# Patient Record
Sex: MALE | Race: WHITE | NOT HISPANIC OR LATINO | Employment: OTHER | URBAN - METROPOLITAN AREA
[De-identification: names, ages, dates, MRNs, and addresses within clinical notes are randomized per-mention and may not be internally consistent; named-entity substitution may affect disease eponyms.]

---

## 2013-01-04 LAB — EJECTION FRACTION: 65

## 2017-01-13 ENCOUNTER — TRANSFERRED RECORDS (OUTPATIENT)
Dept: HEALTH INFORMATION MANAGEMENT | Facility: CLINIC | Age: 67
End: 2017-01-13

## 2017-01-13 LAB — EJECTION FRACTION: 60

## 2017-03-26 DIAGNOSIS — F39 MOOD DISORDER (H): ICD-10-CM

## 2017-03-27 NOTE — TELEPHONE ENCOUNTER
risperiDONE (RISPERDAL) 1 MG tablet     Last Written Prescription Date: 09/21/16  Last Fill Quantity: 180, # refills: 1  [place] [facility] [facility] prescribing provider: 09/21/16       BP Readings from Last 3 Encounters:   09/21/16 124/80   03/14/16 117/80   10/27/15 98/66     Pulse Readings from Last 2 Encounters:   09/21/16 82   03/14/16 80     Lab Results   Component Value Date    GLC 94 09/21/2016     No results found for: WBC  No results found for: RBC  No results found for: HGB  No results found for: HCT  No components found for: MCT  No results found for: MCV  No results found for: MCH  No results found for: MCHC  No results found for: RDW  No results found for: PLT  Lab Results   Component Value Date    CHOL 149 02/24/2015     Lab Results   Component Value Date    HDL 48 02/24/2015     Lab Results   Component Value Date     03/14/2016    LDL 90 02/24/2015     Lab Results   Component Value Date    TRIG 55 02/24/2015     No results found for: GARFIELD [facility]

## 2017-03-28 NOTE — TELEPHONE ENCOUNTER
Routing refill request to provider for review/approval because:  Labs not current:  CBC    Danita Peguero RN

## 2017-03-29 RX ORDER — RISPERIDONE 1 MG/1
TABLET ORAL
Qty: 180 TABLET | Refills: 0 | Status: SHIPPED | OUTPATIENT
Start: 2017-03-29 | End: 2017-06-21

## 2017-03-29 NOTE — TELEPHONE ENCOUNTER
risperiDONE (RISPERDAL) 1 MG tablet     Last Written Prescription Date: 9/21/16  Last Fill Quantity: 180 tablets, # refills: 1  Last Office Visit with G, P or [facility] prescribing provider: 9/21/16        BP Readings from Last 3 Encounters:   09/21/16 124/80   03/14/16 117/80   10/27/15 98/66     Pulse Readings from Last 2 Encounters:   09/21/16 82   03/14/16 80     Lab Results   Component Value Date    GLC 94 09/21/2016     No results found for: WBC  No results found for: RBC  No results found for: HGB  No results found for: HCT  No components found for: MCT  No results found for: MCV  No results found for: MCH  No results found for: MCHC  No results found for: RDW  No results found for: PLT  Lab Results   Component Value Date    CHOL 149 02/24/2015     Lab Results   Component Value Date    HDL 48 02/24/2015     Lab Results   Component Value Date     03/14/2016    LDL 90 02/24/2015     Lab Results   Component Value Date    TRIG 55 02/24/2015     No results found for: GARFIELD [facility]

## 2017-06-21 ENCOUNTER — OFFICE VISIT (OUTPATIENT)
Dept: FAMILY MEDICINE | Facility: CLINIC | Age: 67
End: 2017-06-21
Payer: OTHER GOVERNMENT

## 2017-06-21 VITALS
HEART RATE: 75 BPM | HEIGHT: 75 IN | SYSTOLIC BLOOD PRESSURE: 95 MMHG | WEIGHT: 199 LBS | OXYGEN SATURATION: 95 % | BODY MASS INDEX: 24.74 KG/M2 | TEMPERATURE: 96.5 F | DIASTOLIC BLOOD PRESSURE: 66 MMHG

## 2017-06-21 DIAGNOSIS — F39 MOOD DISORDER (H): Primary | ICD-10-CM

## 2017-06-21 DIAGNOSIS — I42.2 HYPERTROPHIC CARDIOMYOPATHY (H): ICD-10-CM

## 2017-06-21 DIAGNOSIS — I34.0 NON-RHEUMATIC MITRAL REGURGITATION: ICD-10-CM

## 2017-06-21 LAB
ALBUMIN SERPL-MCNC: 3.7 G/DL (ref 3.4–5)
ALP SERPL-CCNC: 96 U/L (ref 40–150)
ALT SERPL W P-5'-P-CCNC: 22 U/L (ref 0–70)
ANION GAP SERPL CALCULATED.3IONS-SCNC: 9 MMOL/L (ref 3–14)
AST SERPL W P-5'-P-CCNC: 18 U/L (ref 0–45)
BILIRUB SERPL-MCNC: 0.3 MG/DL (ref 0.2–1.3)
BUN SERPL-MCNC: 11 MG/DL (ref 7–30)
CALCIUM SERPL-MCNC: 9 MG/DL (ref 8.5–10.1)
CHLORIDE SERPL-SCNC: 106 MMOL/L (ref 94–109)
CHOLEST SERPL-MCNC: 178 MG/DL
CO2 SERPL-SCNC: 25 MMOL/L (ref 20–32)
CREAT SERPL-MCNC: 1.14 MG/DL (ref 0.66–1.25)
GFR SERPL CREATININE-BSD FRML MDRD: 65 ML/MIN/1.7M2
GLUCOSE SERPL-MCNC: 120 MG/DL (ref 70–99)
HDLC SERPL-MCNC: 56 MG/DL
LDLC SERPL CALC-MCNC: 97 MG/DL
NONHDLC SERPL-MCNC: 122 MG/DL
POTASSIUM SERPL-SCNC: 4.3 MMOL/L (ref 3.4–5.3)
PROT SERPL-MCNC: 6.8 G/DL (ref 6.8–8.8)
SODIUM SERPL-SCNC: 140 MMOL/L (ref 133–144)
TRIGL SERPL-MCNC: 124 MG/DL

## 2017-06-21 PROCEDURE — 36415 COLL VENOUS BLD VENIPUNCTURE: CPT | Performed by: FAMILY MEDICINE

## 2017-06-21 PROCEDURE — 80061 LIPID PANEL: CPT | Performed by: FAMILY MEDICINE

## 2017-06-21 PROCEDURE — 99213 OFFICE O/P EST LOW 20 MIN: CPT | Performed by: FAMILY MEDICINE

## 2017-06-21 PROCEDURE — 80053 COMPREHEN METABOLIC PANEL: CPT | Performed by: FAMILY MEDICINE

## 2017-06-21 RX ORDER — RISPERIDONE 1 MG/1
TABLET ORAL
Qty: 180 TABLET | Refills: 1 | Status: SHIPPED | OUTPATIENT
Start: 2017-06-21 | End: 2017-12-13

## 2017-06-21 NOTE — NURSING NOTE
"Chief Complaint   Patient presents with     MOOD CHANGES       Initial BP 95/66 (BP Location: Right arm, Patient Position: Chair, Cuff Size: Adult Large)  Pulse 75  Temp 96.5  F (35.8  C) (Oral)  Ht 6' 3\" (1.905 m)  Wt 199 lb (90.3 kg)  SpO2 95%  BMI 24.87 kg/m2 Estimated body mass index is 24.87 kg/(m^2) as calculated from the following:    Height as of this encounter: 6' 3\" (1.905 m).    Weight as of this encounter: 199 lb (90.3 kg).  Medication Reconciliation: complete   An,CMA (AMAA)      "

## 2017-06-21 NOTE — PROGRESS NOTES
"  SUBJECTIVE:                                                    Javier Lovett is a 66 year old male who presents to clinic today for the following health issues:      Follow up on Mood Changes: Doing well with medication.  No side effects.  Still retired and doing things around the house.    Cardiomyopathy - still seeing cardiology every 6 months and doing well.    Problem list and histories reviewed & adjusted, as indicated.  Additional history: as documented    Patient Active Problem List   Diagnosis     CARDIOVASCULAR SCREENING; LDL GOAL LESS THAN 100     Hypertrophic cardiomyopathy (H)     MR (mitral regurgitation)     Mood disorder (H)     Advance care planning     History reviewed. No pertinent surgical history.    Social History   Substance Use Topics     Smoking status: Never Smoker     Smokeless tobacco: Never Used     Alcohol use 0.0 oz/week     0 Standard drinks or equivalent per week      Comment: social     History reviewed. No pertinent family history.        Reviewed and updated as needed this visit by clinical staff       Reviewed and updated as needed this visit by Provider         ROS:  Constitutional, HEENT, cardiovascular, pulmonary, gi and gu systems are negative, except as otherwise noted.    OBJECTIVE:                                                    BP 95/66 (BP Location: Right arm, Patient Position: Chair, Cuff Size: Adult Large)  Pulse 75  Temp 96.5  F (35.8  C) (Oral)  Ht 6' 3\" (1.905 m)  Wt 199 lb (90.3 kg)  SpO2 95%  BMI 24.87 kg/m2  Body mass index is 24.87 kg/(m^2).  GENERAL: healthy, alert and no distress  NECK: no adenopathy, no asymmetry, masses, or scars and thyroid normal to palpation  RESP: lungs clear to auscultation - no rales, rhonchi or wheezes  CV: regular rates and rhythm, normal S1 S2, no S3 or S4, grade 3/6 systolic murmur, peripheral pulses strong and no peripheral edema  ABDOMEN: soft, nontender, no hepatosplenomegaly, no masses and bowel sounds normal  MS: no " gross musculoskeletal defects noted, no edema  PSYCH: mentation appears normal and affect flat    Diagnostic Test Results:  none      ASSESSMENT/PLAN:                                                    1. Mood disorder (H)  Stable - continue current treatment and check labs.  - Lipid panel reflex to direct LDL  - risperiDONE (RISPERDAL) 1 MG tablet; TAKE 2 TABLETS BY MOUTH EVERY DAY  Dispense: 180 tablet; Refill: 1  - Comprehensive metabolic panel    2. Hypertrophic cardiomyopathy (H)  Continue as per cardiology    3. Non-rheumatic mitral regurgitation  Continue as per cardiology    The uses and side effects, including black box warnings as appropriate, were discussed in detail.  All patient questions were answered.  The patient was instructed to call immediately if any side effects developed.     FUTURE APPOINTMENTS:       - Follow-up visit in 6 - 12 months.    Rafaela Herr MD  [facility]

## 2017-06-21 NOTE — PATIENT INSTRUCTIONS
Based on your medical history and these are the current health maintenance or preventive care services that you are due for (some may have been done at this visit)  Health Maintenance Due   Topic Date Due     LIPID MONITORING Q1 YEAR 03/14/2017         At [facility], we strive to deliver an exceptional experience to you, every time we see you.    If you receive a survey in the mail, please send us back your thoughts. We really do value your feedback.    Your care team's suggested websites for health information:  Www.[employer].org : Up to date and easily searchable information on multiple topics.  Www.medlineplus.gov : medication info, interactive tutorials, watch real surgeries online  Www.familydoctor.org : good info from the Academy of Family Physicians  Www.cdc.gov : public health info, travel advisories, epidemics (H1N1)  Www.aap.org : children's health info, normal development, vaccinations  Www.health.[facility].[place].us : [place] dept of health, public health issues in [place], N1N1    How to contact your care team:   Team [facility] [phone]         Pharmacy [phone]    Dr. Fernandez, Courtney Flores PA-C, Dr. Blood, Ilana CLIFFORD CNP, Kimberley Kwan PA-C, Dr. Lake, and VENESSA Frederick CNP    Team RNs: Gayle Patterson      Clinic hours  M-Th 7 am-7 pm   Fri 7 am-5 pm.   Urgent care M-F 11 am-9 pm,   Sat/Sun 9 am-5 pm.  Pharmacy M-Th 8 am-8 pm Fri 8 am-6 pm  Sat/Sun 9 am-5 pm.     All password changes, disabled accounts, or ID changes in [employer] will be done by our Access Services Department.    If you need help with your account or password, call: [phone]. Clinic staff no longer has the ability to change passwords.

## 2017-06-21 NOTE — MR AVS SNAPSHOT
After Visit Summary   6/21/2017    Javier Lovett    MRN: [id number]           Patient Information     Date Of Birth          1950        Visit Information        Provider Department      6/21/2017 1:40 PM Rafaela Marcano MD [facility]        Today's Diagnoses     Mood disorder (H)    -  1    Hypertrophic cardiomyopathy (H)        Non-rheumatic mitral regurgitation          Care Instructions    Based on your medical history and these are the current health maintenance or preventive care services that you are due for (some may have been done at this visit)  Health Maintenance Due   Topic Date Due     LIPID MONITORING Q1 YEAR  03/14/2017         At [facility], we strive to deliver an exceptional experience to you, every time we see you.    If you receive a survey in the mail, please send us back your thoughts. We really do value your feedback.    Your care team's suggested websites for health information:  Www.[employer] : Up to date and easily searchable information on multiple topics.  Www.medlineplus.gov : medication info, interactive tutorials, watch real surgeries online  Www.familydoctor.org : good info from the Academy of Family Physicians  Www.cdc.gov : public health info, travel advisories, epidemics (H1N1)  Www.aap.org : children's health info, normal development, vaccinations  Www.health.state.[place].us : [place] dept of health, public health issues in [place], N1N1    How to contact your care team:   Team [facility] [phone]         Pharmacy [phone]    Dr. Fernandez, Courtney Flores PA-C, Ilana Campbell CNP, Kimberley Kwan PA-C, Dr. Lake, and VENESSA Frederick CNP    Team RNs: Gayle & Yesenia      Clinic hours  M-Th 7 am-7 pm   Fri 7 am-5 pm.   Urgent care M-F 11 am-9 pm,   Sat/Sun 9 am-5 pm.  Pharmacy M-Th 8 am-8 pm Fri 8 am-6 pm  Sat/Sun 9 am-5 pm.     All password changes, disabled accounts, or ID  "changes in [employer] will be done by our Access Services Department.    If you need help with your account or password, call: [phone]. Clinic staff no longer has the ability to change passwords.             Follow-ups after your visit        Your next 10 appointments already scheduled     Jun 21, 2017  1:40 PM CDT   [facility] Rafaela [facility]   [facility]    [address] [facility] [address] [phone]           Bring a current list of meds and any records pertaining to this visit.  For Physicals, please bring immunization records and any forms needing to be filled out.  Please arrive 10 minutes early to complete paperwork.            Aug 10, 2017  1:00 PM CDT   New Visit with Luis Navarro MD   [facility]    [id number] [place] [address] [phone]              Who to contact     If you have questions or need follow up information about today's clinic visit or your schedule please contact [facility] directly at [phone].  Normal or non-critical lab and imaging results will be communicated to you by [email or web address], letter or phone within 4 business days after the clinic has received the results. If you do not hear from us within 7 days, please contact the clinic through [email or web address] or phone. If you have a critical or abnormal lab result, we will notify you by phone as soon as possible.  Submit refill requests through [employer] or call your pharmacy and they will forward the refill request to us. Please allow 3 business days for your refill to be completed.          Additional Information About Your Visit        [employer] Information     [employer] lets you send messages to your doctor, view your test results, renew your prescriptions, schedule appointments and more. To sign up, go to [email or web address] . Click on \"Log " "in\" on the left side of the screen, which will take you to the Welcome page. Then click on \"Sign up Now\" on the right side of the page.     You will be asked to enter the access code listed below, as well as some personal information. Please follow the directions to create your username and password.     Your access code is: [email or web address]  Expires: 2017  1:39 PM     Your access code will  in 90 days. If you need help or a new code, please call your [facility] or [phone].        Care EveryWhere ID     This is your Care EveryWhere ID. This could be used by other organizations to access your [facility] medical records  [id number]        Your Vitals Were     Pulse Temperature Height Pulse Oximetry BMI (Body Mass Index)       75 96.5  F (35.8  C) (Oral) 6' 3\" (1.905 m) 95% 24.87 kg/m2        Blood Pressure from Last 3 Encounters:   17 95/66   16 124/80   16 117/80    Weight from Last 3 Encounters:   17 199 lb (90.3 kg)   16 203 lb (92.1 kg)   16 204 lb 6.4 oz (92.7 kg)              We Performed the Following     Comprehensive metabolic panel     Lipid panel reflex to direct LDL          Today's Medication Changes          These changes are accurate as of: 17  1:39 PM.  If you have any questions, ask your nurse or doctor.               These medicines have changed or have updated prescriptions.        Dose/Directions    risperiDONE 1 MG tablet   Commonly known as:  risperDAL   This may have changed:  See the new instructions.   Used for:  Mood disorder (H)   Changed by:  Rafaela Marcano MD        TAKE 2 TABLETS BY MOUTH EVERY DAY   Quantity:  180 tablet   Refills:  1            Where to get your medicines      These medications were sent to [email or web address] - [address] AT [facility] & [address], [place] [address]     Phone:  [phone]     risperiDONE 1 MG tablet                " Primary Care Provider Office Phone # Fax #    Rafaela Ashley Herr [phone]       [facility] [address]  [facility] [address]        Equal Access to Services     JESUSALLY JENIFFER : Hadii toño ku hadcameliao Soomaali, waaxda luqadaha, qaybta kaalmada adeegyada, waxay idiin hayaan gwen khpeteraj lynne. So [facility] [phone].    ATENCIÓN: Si habla español, tiene a spangler disposición servicios gratuitos de asistencia lingüística. Llame al [phone].    We comply with applicable federal civil rights laws and [place] laws. We do not discriminate on the basis of race, color, national origin, age, disability sex, sexual orientation or gender identity.            Thank you!     Thank you for choosing [facility]  for your care. Our goal is always to provide you with excellent care. Hearing back from our patients is one way we can continue to improve our services. Please take a few minutes to complete the written survey that you may receive in the mail after your visit with us. Thank you!             Your Updated Medication List - Protect others around you: Learn how to safely use, store and throw away your medicines at www.disposemymeds.org.          This list is accurate as of: 6/21/17  1:39 PM.  Always use your most recent med list.                   Brand Name Dispense Instructions for use Diagnosis    aspirin 81 MG tablet      Take  by mouth daily.        CLINDAMYCIN HCL PO           GARLIC PO      Take  by mouth daily.        metoprolol 12.5 mg Tabs half-tab    LOPRESSOR     Take 12.5 mg by mouth 2 times daily.        MULTI VITAMIN/MINERALS PO      Take  by mouth daily.        risperiDONE 1 MG tablet    risperDAL    180 tablet    TAKE 2 TABLETS BY MOUTH EVERY DAY    Mood disorder (H)       VITAMIN D PO      Take  by mouth daily.

## 2017-06-21 NOTE — LETTER
[address] [address]  [phone]    June 26, 2017    Javier Lovett  [address] [facility] [address]    Mr. Lovett,     All of your labs were normal for you.     Please contact the clinic if you have additional questions.  Thank you.     Sincerely,     Rafaela Herr/kassie    Results for orders placed or performed in visit on 06/21/17   Lipid panel reflex to direct LDL   Result Value Ref Range    Cholesterol 178 <200 mg/dL    Triglycerides 124 <150 mg/dL    HDL Cholesterol 56 >39 mg/dL    LDL Cholesterol Calculated 97 <100 mg/dL    Non HDL Cholesterol 122 <130 mg/dL   Comprehensive metabolic panel   Result Value Ref Range    Sodium 140 133 - 144 mmol/L    Potassium 4.3 3.4 - 5.3 mmol/L    Chloride 106 94 - 109 mmol/L    Carbon Dioxide 25 20 - 32 mmol/L    Anion Gap 9 3 - 14 mmol/L    Glucose 120 (H) 70 - 99 mg/dL    Urea Nitrogen 11 7 - 30 mg/dL    Creatinine 1.14 0.66 - 1.25 mg/dL    GFR Estimate 65 >60 mL/min/1.7m2    GFR Estimate If Black 78 >60 mL/min/1.7m2    Calcium 9.0 8.5 - 10.1 mg/dL    Bilirubin Total 0.3 0.2 - 1.3 mg/dL    Albumin 3.7 3.4 - 5.0 g/dL    Protein Total 6.8 6.8 - 8.8 g/dL    Alkaline Phosphatase 96 40 - 150 U/L    ALT 22 0 - 70 U/L    AST 18 0 - 45 U/L

## 2017-06-26 NOTE — PROGRESS NOTES
MrWilber Lovett,    All of your labs were normal for you.    Please contact the clinic if you have additional questions.  Thank you.    Sincerely,    Rafaela Herr

## 2017-12-13 DIAGNOSIS — F39 MOOD DISORDER (H): ICD-10-CM

## 2017-12-13 NOTE — TELEPHONE ENCOUNTER
Requested Prescriptions   Pending Prescriptions Disp Refills     risperiDONE (RISPERDAL) 1 MG tablet [Pharmacy Med Name: RISPERIDONE 1MG TABLETS]  Last Written Prescription Date:  06/21/17  Last Fill Quantity: 180,  # refills: 1   [place] [facility] or [facility] prescribing provider:  06/21/17   Future Office Visit:    180 tablet 0     Sig: TAKE 2 TABLETS BY MOUTH EVERY DAY    Antipsychotic Medications Failed    12/13/2017  4:03 AM       Failed - Lipid panel on file within the past 12 months    Recent Labs   Lab Test  06/21/17   1339   CHOL  178   TRIG  124   HDL  56   LDL  97              Failed - CBC on file in past 12 months    No lab results found.         Passed - BP is less then 140/90    BP Readings from Last 3 Encounters:   06/21/17 95/66   09/21/16 124/80   03/14/16 117/80                Passed - Patient is 12 years of age or older       Passed - Heart Rate on file within past 12 months    Data Unavailable           Passed - A1c or Glucose on file in past 12 months    Recent Labs   Lab Test  06/21/17   1339   GLC  120*       Please review patients last 3 weights. If a weight gain of >10 lbs exists, you may refill the prescription once after instructing the patient to schedule an appointment within the next 30 days.    Wt Readings from Last 3 Encounters:   06/21/17 199 lb (90.3 kg)   09/21/16 203 lb (92.1 kg)   03/14/16 204 lb 6.4 oz (92.7 kg)            Passed - Recent or future visit with authorizing provider's specialty    Patient had office visit in the last 6 months or has a visit in the next 30 days with authorizing provider.  See chart review.

## 2017-12-15 RX ORDER — RISPERIDONE 1 MG/1
TABLET ORAL
Qty: 60 TABLET | Refills: 0 | Status: SHIPPED | OUTPATIENT
Start: 2017-12-15 | End: 2022-08-01

## 2017-12-15 NOTE — TELEPHONE ENCOUNTER
Medication is being filled for 1 time refill only due to:  Patient needs to be seen because due for a follow up appointment. .    Mirna Aguilar RN

## 2017-12-15 NOTE — TELEPHONE ENCOUNTER
This writer attempted to contact patient  on 12/15/17      Reason for call refill and left detailed message needs to be seen.        Clary Warner MA

## 2018-06-12 ENCOUNTER — TRANSFERRED RECORDS (OUTPATIENT)
Dept: HEALTH INFORMATION MANAGEMENT | Facility: CLINIC | Age: 68
End: 2018-06-12

## 2018-12-05 ENCOUNTER — OFFICE VISIT (OUTPATIENT)
Dept: URGENT CARE | Facility: URGENT CARE | Age: 68
End: 2018-12-05
Payer: MEDICARE

## 2018-12-05 ENCOUNTER — OFFICE VISIT (OUTPATIENT)
Dept: OPTOMETRY | Facility: CLINIC | Age: 68
End: 2018-12-05
Payer: MEDICARE

## 2018-12-05 VITALS
SYSTOLIC BLOOD PRESSURE: 110 MMHG | BODY MASS INDEX: 22.75 KG/M2 | RESPIRATION RATE: 18 BRPM | DIASTOLIC BLOOD PRESSURE: 79 MMHG | TEMPERATURE: 99.4 F | OXYGEN SATURATION: 96 % | WEIGHT: 182 LBS | HEART RATE: 95 BPM

## 2018-12-05 DIAGNOSIS — B02.30 ZOSTER OCULAR DISEASE: Primary | ICD-10-CM

## 2018-12-05 DIAGNOSIS — B02.7 DISSEMINATED HERPES ZOSTER: Primary | ICD-10-CM

## 2018-12-05 PROCEDURE — 99202 OFFICE O/P NEW SF 15 MIN: CPT | Performed by: OPTOMETRIST

## 2018-12-05 PROCEDURE — 99213 OFFICE O/P EST LOW 20 MIN: CPT | Performed by: NURSE PRACTITIONER

## 2018-12-05 RX ORDER — METOPROLOL SUCCINATE 50 MG/1
50 TABLET, EXTENDED RELEASE ORAL
COMMUNITY
Start: 2018-06-12 | End: 2019-04-16

## 2018-12-05 RX ORDER — VALACYCLOVIR HYDROCHLORIDE 1 G/1
1000 TABLET, FILM COATED ORAL 3 TIMES DAILY
Qty: 30 TABLET | Refills: 0 | Status: SHIPPED | OUTPATIENT
Start: 2018-12-05 | End: 2019-03-04

## 2018-12-05 RX ORDER — BACITRACIN 500 [USP'U]/G
OINTMENT OPHTHALMIC
Qty: 1 TUBE | Refills: 1 | Status: SHIPPED | OUTPATIENT
Start: 2018-12-05 | End: 2019-06-21

## 2018-12-05 ASSESSMENT — VISUAL ACUITY
OD_CC: 20/20
CORRECTION_TYPE: GLASSES
METHOD: SNELLEN - LINEAR
OS_CC: 20/25
OS_CC+: -2

## 2018-12-05 ASSESSMENT — ENCOUNTER SYMPTOMS
RHINORRHEA: 0
SHORTNESS OF BREATH: 0
VOMITING: 0
NAUSEA: 0
COUGH: 0
CHILLS: 0
SORE THROAT: 0
DIARRHEA: 0
FEVER: 0
DIAPHORESIS: 0

## 2018-12-05 NOTE — PROGRESS NOTES
SUBJECTIVE:   Javier Lovett is a 68 year old male presenting with a chief complaint of   Chief Complaint   Patient presents with     Allergic Reaction     started Friday       He is an established patient of [facility].    Rash    Onset of rash was 4 day(s) ago.   Course of illness is sudden onset and worsening.  Severity moderate  Current and Associated symptoms: painful, red and blistering   Location of the rash: right side of  face.  Previous history of a similar rash? No  Recent exposure history: none known  Denies exposure to: none known  Associated symptoms include: nothing.  Treatment measures tried include: none      Review of Systems   Constitutional: Negative for chills, diaphoresis and fever.   HENT: Negative for congestion, ear pain, rhinorrhea and sore throat.    Respiratory: Negative for cough and shortness of breath.    Gastrointestinal: Negative for diarrhea, nausea and vomiting.   Skin: Positive for rash.   All other systems reviewed and are negative.      No past medical history on file.  History reviewed. No pertinent family history.  Current Outpatient Prescriptions   Medication Sig Dispense Refill     Cholecalciferol (VITAMIN D PO) Take  by mouth daily.       CLINDAMYCIN HCL PO        GARLIC PO Take  by mouth daily.       metoprolol succinate ER (TOPROL-XL) 50 MG 24 hr tablet Take 50 mg by mouth       Multiple Vitamins-Minerals (MULTI VITAMIN/MINERALS PO) Take  by mouth daily.       risperiDONE (RISPERDAL) 1 MG tablet TAKE 2 TABLETS BY MOUTH EVERY DAY 60 tablet 0     valACYclovir (VALTREX) 1000 mg tablet Take 1 tablet (1,000 mg) by mouth 3 times daily for 10 days 30 tablet 0     apixaban ANTICOAGULANT (ELIQUIS) 5 MG tablet Take 5 mg by mouth       Social History   Substance Use Topics     Smoking status: Never Smoker     Smokeless tobacco: Never Used     Alcohol use 0.0 oz/week     0 Standard drinks or equivalent per week      Comment: social       OBJECTIVE  /79 (BP Location: Left arm,  Patient Position: Chair, Cuff Size: Adult Large)  Pulse 95  Temp 99.4  F (37.4  C) (Oral)  Resp 18  Wt 182 lb (82.6 kg)  SpO2 96%  BMI 22.75 kg/m2    Physical Exam   Eyes: Right conjunctiva is injected.   Cardiovascular: Normal rate, S1 normal, S2 normal and normal heart sounds.    Pulmonary/Chest: Effort normal and breath sounds normal.   Neurological: He is alert.   Skin:   The right side of face with erythematous maculopapular rash with vesicles and some with crusts   Psychiatric: He has a normal mood and affect. His speech is normal.     ASSESSMENT:      ICD-10-CM    1. Disseminated herpes zoster B02.7 valACYclovir (VALTREX) 1000 mg tablet          PLAN:  Advised to see ophthalmology ASAP to evaluate the right eye redness and any shingles involvement .  I discussed that shingles is contagious  I have discussed clinical findings with patient.  All questions are answered and patient is in agreement with plan.   Patient care instructions are given to at the end of visit.          Patient Instructions       Shingles  Shingles is a viral infection caused by the same virus that causes chicken pox. Anyone who has had chicken pox may get shingles later in life. The virus stays in the body, but remains asleep (dormant). Shingles often occurs in older persons or persons with lowered immunity. But it can affect anyone at any age.  Shingles starts as a tingling patch of skin on one side of the body. Small, painful blisters may then appear. The rash rarely spreads to other parts of the body.  Exposure to shingles can't cause shingles. However, it can cause chicken pox in anyone who has not had chicken pox or has not been vaccinated. The contagious period ends when all blisters have crusted over, generally 1 to 2 weeks after the illness starts.  After the blisters heal, the affected skin may be sensitive or painful for weeks or months, gradually resolving over time. But, sometimes this can last longer and be permanent  (called postherpetic neuralgia.)  Shingles vaccines are available. Vaccination can help prevent shingles or make it less painful. It is generally recommended for adults older than 50, even if you've had singles in the past. Talk with your healthcare provider about when to get vaccinated and which vaccine is best for you.  Home care    Medicines may be prescribed to help relieve pain. Take these medicines as directed. Ask your healthcare provider or pharmacist before using over-the-counter medicines for helping treat pain and itching.    In certain cases, antiviral medicines may be prescribed to reduce pain, shorten the illness, and prevent neuralgia. Take these medicines as directed.    Compresses made from a solution of cool water mixed with cornstarch or baking soda may help relieve pain and itching.     Gently wash skin daily with soap and water to help prevent infection. Be certain to rinse off all of the soap, which can be irritating.    Trim fingernails and try not to scratch. Scratching the sores may leave scars.    Stay home from work or school until all blisters have formed a crust and you are no longer contagious.  Follow-up care  Follow up with your healthcare provider, or as directed.  When to seek medical advice    Fever of 100.4 F (38 C) or higher, or as directed by your healthcare provider    Affected skin is on the face or neck and any of the following occur:  ? Headache  ? Eye pain  ? Changes in vision  ? Sores near the eye  ? Weakness of facial muscles    Blisters occurring on new areas of the body    Pain, redness, or swelling of a joint    Signs of skin infection: colored drainage from the sores, warmth, increasing redness, fever, or increasing pain   Date Last Reviewed: 4/1/2018 2000-2018 [employer]. [address] [place] [address]. All rights reserved. This information is not intended as a substitute for professional medical care. Always follow your healthcare  professional's instructions.

## 2018-12-05 NOTE — MR AVS SNAPSHOT
After Visit Summary   12/5/2018    Javier Lovett    MRN: [id number]           Patient Information     Date Of Birth          1950        Visit Information        Provider Department      12/5/2018 6:20 PM Viktoria Vega OD [facility]        Today's Diagnoses     Zoster ocular disease    -  1      Care Instructions    Use the ointment on your lid margins and please return tomorrow for a check. Pressure check and dilation at that visit.          Follow-ups after your visit        Your next 10 appointments already scheduled     Dec 06, 2018  3:40 PM CST   Return Visit with Geoff Foote OD   [facility] ([facility])    [address] [place] [id number] [phone]              Who to contact     If you have questions or need follow up information about today's clinic visit or your schedule please contact [facility] directly at [phone].  Normal or non-critical lab and imaging results will be communicated to you by MyChart, letter or phone within 4 business days after the clinic has received the results. If you do not hear from us within 7 days, please contact the clinic through MyChart or phone. If you have a critical or abnormal lab result, we will notify you by phone as soon as possible.  Submit refill requests through [email or web address] or call your pharmacy and they will forward the refill request to us. Please allow 3 business days for your refill to be completed.          Additional Information About Your Visit        Care EveryWhere ID     This is your Care EveryWhere ID. This could be used by other organizations to access your [facility] medical records  [id number]         Blood Pressure from Last 3 Encounters:   12/05/18 110/79   06/21/17 95/66   09/21/16 124/80    Weight from Last 3 Encounters:   12/05/18 82.6 kg (182 lb)   06/21/17 90.3 kg (199 lb)   09/21/16 92.1 kg (203 lb)              Today, you had  the following     No orders found for display         Today's Medication Changes          These changes are accurate as of 12/5/18 11:59 PM.  If you have any questions, ask your nurse or doctor.               Start taking these medicines.        Dose/Directions    bacitracin 500 UNIT/GM ophthalmic ointment   Used for:  Zoster ocular disease   Started by:  Viktoria Vega OD        Apply a small amount to right eyelid twice a day for 7 days.   Quantity:  1 Tube   Refills:  1       valACYclovir 1000 mg tablet   Commonly known as:  VALTREX   Used for:  Disseminated herpes zoster   Started by:  Cindy Waddell NP        Dose:  1000 mg   Take 1 tablet (1,000 mg) by mouth 3 times daily for 10 days   Quantity:  30 tablet   Refills:  0            Where to get your medicines      These medications were sent to [id number] - [place], [place] - [address] AT [facility] [address], [facility] [address]     Phone:  [phone]     bacitracin 500 UNIT/GM ophthalmic ointment    valACYclovir 1000 mg tablet                Primary Care Provider Office Phone # Fax #    Rafaela Toneyyeyo Herr [phone]       [address]  [facility] [address]        Equal Access to Services     BRIANA SWIFT AH: Hadii toño ku hadasho Soomaali, waaxda luqadaha, qaybta kaalmada adeegyada, waxay idiin hayaan gwen lynne. So [facility] [phone].    ATENCIÓN: Si habla español, tiene a spangler disposición servicios gratuitos de asistencia lingüística. ame al [phone].    We comply with applicable federal civil rights laws and [place] laws. We do not discriminate on the basis of race, color, national origin, age, disability, sex, sexual orientation, or gender identity.            Thank you!     Thank you for choosing [facility]  for your care. Our goal is always to provide you with excellent care. Hearing back from our patients is one way we can  continue to improve our services. Please take a few minutes to complete the written survey that you may receive in the mail after your visit with us. Thank you!             Your Updated Medication List - Protect others around you: Learn how to safely use, store and throw away your medicines at www.disposemymeds.org.          This list is accurate as of 12/5/18 11:59 PM.  Always use your most recent med list.                   Brand Name Dispense Instructions for use Diagnosis    bacitracin 500 UNIT/GM ophthalmic ointment     1 Tube    Apply a small amount to right eyelid twice a day for 7 days.    Zoster ocular disease       CLINDAMYCIN HCL PO           ELIQUIS 5 MG tablet   Generic drug:  apixaban ANTICOAGULANT      Take 5 mg by mouth        GARLIC PO      Take  by mouth daily.        metoprolol succinate ER 50 MG 24 hr tablet    TOPROL-XL     Take 50 mg by mouth        MULTI VITAMIN/MINERALS PO      Take  by mouth daily.        risperiDONE 1 MG tablet    risperDAL    60 tablet    TAKE 2 TABLETS BY MOUTH EVERY DAY    Mood disorder (H)       valACYclovir 1000 mg tablet    VALTREX    30 tablet    Take 1 tablet (1,000 mg) by mouth 3 times daily for 10 days    Disseminated herpes zoster       VITAMIN D PO      Take  by mouth daily.

## 2018-12-05 NOTE — PROGRESS NOTES
Chief Complaint   Patient presents with     Eye Problem Right Eye       HPI    Affected eye(s):  Right   Symptoms:     Puffy eyes      Duration:  6 days      Do you have eye pain now?:  No      Comments:  Pt was diagnosed with shingles on the right side of his face and  Wants to make sure not in her right eye.    Last eye exam 10+    Pt accompanied by daughter Tiffanie             Medical, surgical and family histories reviewed and updated 12/5/2018.       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 Pressure check and dilation at that visit.

## 2018-12-05 NOTE — MR AVS SNAPSHOT
After Visit Summary   12/5/2018    Javier Lovett    MRN: [id number]           Patient Information     Date Of Birth          1950        Visit Information        Provider Department      12/5/2018 3:05 PM Cindy Waddell NP [facility]        Today's Diagnoses     Disseminated herpes zoster    -  1      Care Instructions      Shingles  Shingles is a viral infection caused by the same virus that causes chicken pox. Anyone who has had chicken pox may get shingles later in life. The virus stays in the body, but remains asleep (dormant). Shingles often occurs in older persons or persons with lowered immunity. But it can affect anyone at any age.  Shingles starts as a tingling patch of skin on one side of the body. Small, painful blisters may then appear. The rash rarely spreads to other parts of the body.  Exposure to shingles can't cause shingles. However, it can cause chicken pox in anyone who has not had chicken pox or has not been vaccinated. The contagious period ends when all blisters have crusted over, generally 1 to 2 weeks after the illness starts.  After the blisters heal, the affected skin may be sensitive or painful for weeks or months, gradually resolving over time. But, sometimes this can last longer and be permanent (called postherpetic neuralgia.)  Shingles vaccines are available. Vaccination can help prevent shingles or make it less painful. It is generally recommended for adults older than 50, even if you've had singles in the past. Talk with your healthcare provider about when to get vaccinated and which vaccine is best for you.  Home care    Medicines may be prescribed to help relieve pain. Take these medicines as directed. Ask your healthcare provider or pharmacist before using over-the-counter medicines for helping treat pain and itching.    In certain cases, antiviral medicines may be prescribed to reduce pain, shorten the illness, and prevent neuralgia. Take these  medicines as directed.    Compresses made from a solution of cool water mixed with cornstarch or baking soda may help relieve pain and itching.     Gently wash skin daily with soap and water to help prevent infection. Be certain to rinse off all of the soap, which can be irritating.    Trim fingernails and try not to scratch. Scratching the sores may leave scars.    Stay home from work or school until all blisters have formed a crust and you are no longer contagious.  Follow-up care  Follow up with your healthcare provider, or as directed.  When to seek medical advice    Fever of 100.4 F (38 C) or higher, or as directed by your healthcare provider    Affected skin is on the face or neck and any of the following occur:  ? Headache  ? Eye pain  ? Changes in vision  ? Sores near the eye  ? Weakness of facial muscles    Blisters occurring on new areas of the body    Pain, redness, or swelling of a joint    Signs of skin infection: colored drainage from the sores, warmth, increasing redness, fever, or increasing pain   Date Last Reviewed: 4/1/2018 2000-2018 [employer]. [address] [place] [address]. All rights reserved. This information is not intended as a substitute for professional medical care. Always follow your healthcare professional's instructions.                Follow-ups after your visit        Who to contact     If you have questions or need follow up information about today's clinic visit or your schedule please contact [facility] directly at [phone].  Normal or non-critical lab and imaging results will be communicated to you by MyChart, letter or phone within 4 business days after the clinic has received the results. If you do not hear from us within 7 days, please contact the clinic through [employer] or phone. If you have a critical or abnormal lab result, we will notify you by phone as soon as possible.  Submit refill requests through [employer] or call  your pharmacy and they will forward the refill request to us. Please allow 3 business days for your refill to be completed.          Additional Information About Your Visit        Care EveryWhere ID     This is your Care EveryWhere ID. This could be used by other organizations to access your [facility] medical records  [id number]        Your Vitals Were     Pulse Temperature Respirations Pulse Oximetry BMI (Body Mass Index)       95 99.4  F (37.4  C) (Oral) 18 96% 22.75 kg/m2        Blood Pressure from Last 3 Encounters:   12/05/18 110/79   06/21/17 95/66   09/21/16 124/80    Weight from Last 3 Encounters:   12/05/18 182 lb (82.6 kg)   06/21/17 199 lb (90.3 kg)   09/21/16 203 lb (92.1 kg)              Today, you had the following     No orders found for display         Today's Medication Changes          These changes are accurate as of 12/5/18  3:47 PM.  If you have any questions, ask your nurse or doctor.               Start taking these medicines.        Dose/Directions    valACYclovir 1000 mg tablet   Commonly known as:  VALTREX   Used for:  Disseminated herpes zoster   Started by:  Cindy Waddell NP        Dose:  1000 mg   Take 1 tablet (1,000 mg) by mouth 3 times daily for 10 days   Quantity:  30 tablet   Refills:  0            Where to get your medicines      These medications were sent to [facility] - [place], [place] - [address] AT [facility] [address], [place] [facility] [address]     Phone:  [phone]     valACYclovir 1000 mg tablet                Primary Care Provider Office Phone # Fax #    Rafaela Herr [phone]       [address]  Health system [address]        Equal Access to Services     [facility] JENIFFER AH: Shayy Ernst, walizbethda luqadaha, qaybta kaalmada adegalloyada, dinora lynne. So [facility] [phone].    ATENCIÓN: Si habla español, tiene a spangler disposición servicios gratuitos  de asistencia lingüística. Everardo high [phone].    We comply with applicable federal civil rights laws and [place] laws. We do not discriminate on the basis of race, color, national origin, age, disability, sex, sexual orientation, or gender identity.            Thank you!     Thank you for choosing [facility]  for your care. Our goal is always to provide you with excellent care. Hearing back from our patients is one way we can continue to improve our services. Please take a few minutes to complete the written survey that you may receive in the mail after your visit with us. Thank you!             Your Updated Medication List - Protect others around you: Learn how to safely use, store and throw away your medicines at www.disposemymeds.org.          This list is accurate as of 12/5/18  3:47 PM.  Always use your most recent med list.                   Brand Name Dispense Instructions for use Diagnosis    CLINDAMYCIN HCL PO           ELIQUIS 5 MG tablet   Generic drug:  apixaban ANTICOAGULANT      Take 5 mg by mouth        GARLIC PO      Take  by mouth daily.        metoprolol succinate ER 50 MG 24 hr tablet    TOPROL-XL     Take 50 mg by mouth        MULTI VITAMIN/MINERALS PO      Take  by mouth daily.        risperiDONE 1 MG tablet    risperDAL    60 tablet    TAKE 2 TABLETS BY MOUTH EVERY DAY    Mood disorder (H)       valACYclovir 1000 mg tablet    VALTREX    30 tablet    Take 1 tablet (1,000 mg) by mouth 3 times daily for 10 days    Disseminated herpes zoster       VITAMIN D PO      Take  by mouth daily.

## 2018-12-05 NOTE — LETTER
12/5/2018         RE: Javier Lovett  [address] [facility] [address]        Dear Colleague,    Thank you for referring your patient, Javier Lovett, to the [facility]. Please see a copy of my visit note below.    Chief Complaint   Patient presents with     Eye Problem Right Eye       HPI    Affected eye(s):  Right   Symptoms:     Puffy eyes      Duration:  6 days      Do you have eye pain now?:  No      Comments:  Pt was diagnosed with shingles on the right side of his face and  Wants to make sure not in her right eye.    Last eye exam 10+    Pt accompanied by daughter Tiffanie             Medical, surgical and family histories reviewed and updated 12/5/2018.       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 Pressure check and dilation at that visit.         Again, thank you for allowing me to participate in the care of your patient.        Sincerely,        Viktoria Vega, OD

## 2018-12-05 NOTE — PATIENT INSTRUCTIONS
Shingles  Shingles is a viral infection caused by the same virus that causes chicken pox. Anyone who has had chicken pox may get shingles later in life. The virus stays in the body, but remains asleep (dormant). Shingles often occurs in older persons or persons with lowered immunity. But it can affect anyone at any age.  Shingles starts as a tingling patch of skin on one side of the body. Small, painful blisters may then appear. The rash rarely spreads to other parts of the body.  Exposure to shingles can't cause shingles. However, it can cause chicken pox in anyone who has not had chicken pox or has not been vaccinated. The contagious period ends when all blisters have crusted over, generally 1 to 2 weeks after the illness starts.  After the blisters heal, the affected skin may be sensitive or painful for weeks or months, gradually resolving over time. But, sometimes this can last longer and be permanent (called postherpetic neuralgia.)  Shingles vaccines are available. Vaccination can help prevent shingles or make it less painful. It is generally recommended for adults older than 50, even if you've had singles in the past. Talk with your healthcare provider about when to get vaccinated and which vaccine is best for you.  Home care    Medicines may be prescribed to help relieve pain. Take these medicines as directed. Ask your healthcare provider or pharmacist before using over-the-counter medicines for helping treat pain and itching.    In certain cases, antiviral medicines may be prescribed to reduce pain, shorten the illness, and prevent neuralgia. Take these medicines as directed.    Compresses made from a solution of cool water mixed with cornstarch or baking soda may help relieve pain and itching.     Gently wash skin daily with soap and water to help prevent infection. Be certain to rinse off all of the soap, which can be irritating.    Trim fingernails and try not to scratch. Scratching the sores may leave  scars.    Stay home from work or school until all blisters have formed a crust and you are no longer contagious.  Follow-up care  Follow up with your healthcare provider, or as directed.  When to seek medical advice    Fever of 100.4 F (38 C) or higher, or as directed by your healthcare provider    Affected skin is on the face or neck and any of the following occur:  ? Headache  ? Eye pain  ? Changes in vision  ? Sores near the eye  ? Weakness of facial muscles    Blisters occurring on new areas of the body    Pain, redness, or swelling of a joint    Signs of skin infection: colored drainage from the sores, warmth, increasing redness, fever, or increasing pain   Date Last Reviewed: 4/1/2018 2000-2018 [employer]. [address] [place] [address], [place] [address]. All rights reserved. This information is not intended as a substitute for professional medical care. Always follow your healthcare professional's instructions.

## 2018-12-06 ENCOUNTER — OFFICE VISIT (OUTPATIENT)
Dept: OPTOMETRY | Facility: CLINIC | Age: 68
End: 2018-12-06
Payer: MEDICARE

## 2018-12-06 DIAGNOSIS — B02.30 ZOSTER OCULAR DISEASE: Primary | ICD-10-CM

## 2018-12-06 PROCEDURE — 92012 INTRM OPH EXAM EST PATIENT: CPT | Performed by: OPTOMETRIST

## 2018-12-06 ASSESSMENT — VISUAL ACUITY
CORRECTION_TYPE: GLASSES
OS_CC: 20/20
OD_CC: 20/20
METHOD: SNELLEN - LINEAR

## 2018-12-06 ASSESSMENT — TONOMETRY
OS_IOP_MMHG: 14
IOP_METHOD: APPLANATION
OD_IOP_MMHG: 16

## 2018-12-06 ASSESSMENT — CONF VISUAL FIELD
OD_NORMAL: 1
OS_NORMAL: 1

## 2018-12-06 ASSESSMENT — SLIT LAMP EXAM - LIDS: COMMENTS: NORMAL

## 2018-12-06 ASSESSMENT — CUP TO DISC RATIO
OD_RATIO: 0.40
OS_RATIO: 0.30

## 2018-12-06 NOTE — PROGRESS NOTES
Chief Complaint   Patient presents with     Eye Problem Right Eye       Do you wear contact lenses? No    HPI    Affected eye(s):  Right   Symptoms:     Puffy eyes   Redness      Duration:  6 days   Frequency:  Constant       Do you have eye pain now?:  No      Comments:  Patient was seen yesterday by Dr. Vega-patient is here for a IOP/Dilation per Dr. Vega. Patient has had 3 doses of the Valtrex since yesterday. Patient was unable to  the bacitracin ointment but will  today.             Geoff Foote, OD     See Review Of Systems   Eyes: positive for redness, lesions on periorbital skin  Constitutional: No fevers, chills, or weight changes.      HPI performed by Optometrist  scribed by Dory Arnold, Optometric Tech    Medical, surgical and family histories reviewed and updated 12/6/2018.         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 No involvement of eye itself. Patient denies pain at this time.     Continue course of Valtrex oral medication as directed.   Recommend filling Bacitracin Rx from Dr. Vega to be used on eyelid area.    Return to clinic if symptoms worsen or you develop ocular pain. Otherwise, recommend comprehensive exam in January 2019.      Geoff Foote O.D.  [address] [place]  [address]    [phone]

## 2018-12-06 NOTE — LETTER
12/6/2018         RE: Javier Lovett  [address] [facility] [address]        Dear Colleague,    Thank you for referring your patient, Javier Lovett, to the [facility]. Please see a copy of my visit note below.    Chief Complaint   Patient presents with     Eye Problem Right Eye       Do you wear contact lenses? No    HPI    Affected eye(s):  Right   Symptoms:     Puffy eyes   Redness      Duration:  6 days   Frequency:  Constant       Do you have eye pain now?:  No      Comments:  Patient was seen yesterday by Dr. Vega-patient is here for a IOP/Dilation per Dr. Vega. Patient has had 3 doses of the Valtrex since yesterday. Patient was unable to  the bacitracin ointment but will  today.             Geoff Foote, OD     See Review Of Systems   Eyes: positive for redness, lesions on periorbital skin  Constitutional: No fevers, chills, or weight changes.      HPI performed by Optometrist  scribed by Dory Arnold, Optometric Tech    Medical, surgical and family histories reviewed and updated 12/6/2018.         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 No involvement of eye itself. Patient denies pain at this time.     Continue course of Valtrex oral medication as directed.   Recommend filling Bacitracin Rx from Dr. Vega to be used on eyelid area.    Return to clinic if symptoms worsen or you develop ocular pain. Otherwise, recommend comprehensive exam in January 2019.      Geoff Foote O.D.  [facility]  [address] [place] [address]. [place]  [address]    [phone]                Again, thank you for allowing me to participate in the care of your patient.        Sincerely,        Geoff Foote, OD

## 2018-12-06 NOTE — MR AVS SNAPSHOT
After Visit Summary   12/6/2018    Javier Lovett    MRN: [id number]           Patient Information     Date Of Birth          1950        Visit Information        Provider Department      12/6/2018 3:40 PM Geoff Foote, EDWARD [facility]        Today's Diagnoses     Zoster ocular disease    -  1      Care Instructions    Shingles involving periorbital skin and lower eyelid. No involvement of eye itself.    Continue course of Valtrex oral medication as directed.   Recommend filling Bacitracin Rx from Dr. Vega to be used on eyelid area.    Return to clinic if symptoms worsen or you develop ocular pain. Otherwise, recommend comprehensive exam in January 2019.      Geoff Foote O.D.  [facility]  [address]. [place]  [address]    [phone]                 Follow-ups after your visit        Follow-up notes from your care team     Return if symptoms worsen or fail to improve.      Who to contact     If you have questions or need follow up information about today's clinic visit or your schedule please contact [facility] directly at [phone].  Normal or non-critical lab and imaging results will be communicated to you by MyChart, letter or phone within 4 business days after the clinic has received the results. If you do not hear from us within 7 days, please contact the clinic through MyChart or phone. If you have a critical or abnormal lab result, we will notify you by phone as soon as possible.  Submit refill requests through [employer] or call your pharmacy and they will forward the refill request to us. Please allow 3 business days for your refill to be completed.          Additional Information About Your Visit        Care EveryWhere ID     This is your Care EveryWhere ID. This could be used by other organizations to access your [facility] medical records  [id number]         Blood Pressure from Last 3 Encounters:   12/05/18 110/79    06/21/17 95/66   09/21/16 124/80    Weight from Last 3 Encounters:   12/05/18 82.6 kg (182 lb)   06/21/17 90.3 kg (199 lb)   09/21/16 92.1 kg (203 lb)              Today, you had the following     No orders found for display       Primary Care Provider Office Phone # Fax #    Rafaela Gomezluis Herr [phone]       [address]  [facility] [address]        Equal Access to Services     [facility]: Hadii aad ku hadasho Soomaali, waaxda luqadaha, qaybta kaalmada adeegyada, waxay idiin haydean adegallo anna . So [facility] [phone].    ATENCIÓN: Si habla español, tiene a spangler disposición servicios gratuitos de asistencia lingüística. [facility] [phone].    We comply with applicable federal civil rights laws and [place] laws. We do not discriminate on the basis of race, color, national origin, age, disability, sex, sexual orientation, or gender identity.            Thank you!     Thank you for choosing [facility]  for your care. Our goal is always to provide you with excellent care. Hearing back from our patients is one way we can continue to improve our services. Please take a few minutes to complete the written survey that you may receive in the mail after your visit with us. Thank you!             Your Updated Medication List - Protect others around you: Learn how to safely use, store and throw away your medicines at www.disposemymeds.org.          This list is accurate as of 12/6/18  4:39 PM.  Always use your most recent med list.                   Brand Name Dispense Instructions for use Diagnosis    bacitracin 500 UNIT/GM ophthalmic ointment     1 Tube    Apply a small amount to right eyelid twice a day for 7 days.    Zoster ocular disease       CLINDAMYCIN HCL PO           ELIQUIS 5 MG tablet   Generic drug:  apixaban ANTICOAGULANT      Take 5 mg by mouth        GARLIC PO      Take  by mouth daily.        metoprolol succinate ER 50 MG 24 hr tablet     TOPROL-XL     Take 50 mg by mouth        MULTI VITAMIN/MINERALS PO      Take  by mouth daily.        risperiDONE 1 MG tablet    risperDAL    60 tablet    TAKE 2 TABLETS BY MOUTH EVERY DAY    Mood disorder (H)       valACYclovir 1000 mg tablet    VALTREX    30 tablet    Take 1 tablet (1,000 mg) by mouth 3 times daily for 10 days    Disseminated herpes zoster       VITAMIN D PO      Take  by mouth daily.

## 2018-12-06 NOTE — PATIENT INSTRUCTIONS
Use the ointment on your lid margins and please return tomorrow for a check. Pressure check and dilation at that visit.

## 2018-12-06 NOTE — PATIENT INSTRUCTIONS
Shingles involving periorbital skin and lower eyelid. No involvement of eye itself. Patient denies pain at this time.     Continue course of Valtrex oral medication as directed.   Recommend filling Bacitracin Rx from Dr. Vega to be used on eyelid area.    Return to clinic if symptoms worsen or you develop ocular pain. Otherwise, recommend comprehensive exam in January 2019.      Geoff Foote O.D.  [address]  [place]  [address]    [phone]

## 2019-02-23 ENCOUNTER — TRANSFERRED RECORDS (OUTPATIENT)
Dept: HEALTH INFORMATION MANAGEMENT | Facility: CLINIC | Age: 69
End: 2019-02-23

## 2019-02-24 LAB
CHOLESTEROL (EXTERNAL): 134 MG/DL
HDLC SERPL-MCNC: 48 MG/DL
LDL CHOLESTEROL (EXTERNAL): 77 MG/DL
TRIGLYCERIDES (EXTERNAL): 46 MG/DL

## 2019-02-26 ENCOUNTER — TRANSFERRED RECORDS (OUTPATIENT)
Dept: HEALTH INFORMATION MANAGEMENT | Facility: CLINIC | Age: 69
End: 2019-02-26

## 2019-02-27 ENCOUNTER — TRANSFERRED RECORDS (OUTPATIENT)
Dept: HEALTH INFORMATION MANAGEMENT | Facility: CLINIC | Age: 69
End: 2019-02-27

## 2019-02-27 LAB — EJECTION FRACTION: >70

## 2019-03-04 ENCOUNTER — OFFICE VISIT (OUTPATIENT)
Dept: FAMILY MEDICINE | Facility: CLINIC | Age: 69
End: 2019-03-04
Payer: MEDICARE

## 2019-03-04 VITALS
HEART RATE: 61 BPM | BODY MASS INDEX: 26.77 KG/M2 | OXYGEN SATURATION: 99 % | WEIGHT: 191.2 LBS | TEMPERATURE: 98.2 F | HEIGHT: 71 IN | DIASTOLIC BLOOD PRESSURE: 69 MMHG | SYSTOLIC BLOOD PRESSURE: 106 MMHG

## 2019-03-04 DIAGNOSIS — I48.0 PAROXYSMAL ATRIAL FIBRILLATION (H): Primary | ICD-10-CM

## 2019-03-04 DIAGNOSIS — F39 MOOD DISORDER (H): ICD-10-CM

## 2019-03-04 DIAGNOSIS — D64.9 ANEMIA, UNSPECIFIED TYPE: ICD-10-CM

## 2019-03-04 DIAGNOSIS — I42.2 HYPERTROPHIC CARDIOMYOPATHY (H): ICD-10-CM

## 2019-03-04 DIAGNOSIS — Z23 NEED FOR SHINGLES VACCINE: ICD-10-CM

## 2019-03-04 PROBLEM — I48.91 ATRIAL FIBRILLATION (H): Status: ACTIVE | Noted: 2019-03-04

## 2019-03-04 LAB — HGB BLD-MCNC: 13 G/DL (ref 13.3–17.7)

## 2019-03-04 PROCEDURE — 99214 OFFICE O/P EST MOD 30 MIN: CPT | Performed by: FAMILY MEDICINE

## 2019-03-04 PROCEDURE — 36415 COLL VENOUS BLD VENIPUNCTURE: CPT | Performed by: FAMILY MEDICINE

## 2019-03-04 PROCEDURE — 85018 HEMOGLOBIN: CPT | Performed by: FAMILY MEDICINE

## 2019-03-04 PROCEDURE — 90750 HZV VACC RECOMBINANT IM: CPT | Performed by: FAMILY MEDICINE

## 2019-03-04 PROCEDURE — 90471 IMMUNIZATION ADMIN: CPT | Performed by: FAMILY MEDICINE

## 2019-03-04 RX ORDER — AMIODARONE HYDROCHLORIDE 200 MG/1
TABLET ORAL
COMMUNITY
Start: 2019-02-24 | End: 2019-06-21

## 2019-03-04 ASSESSMENT — PAIN SCALES - GENERAL: PAINLEVEL: NO PAIN (0)

## 2019-03-04 ASSESSMENT — MIFFLIN-ST. JEOR: SCORE: 1676.66

## 2019-03-04 NOTE — LETTER
March 5, 2019      Javier Lovett  [address] [facility] [facility] [address]        Dear Javier,       Your hemoglobin is stable.     Please contact the clinic if you have additional questions.  Thank you.     Sincerely,     Rafaela Herr

## 2019-03-04 NOTE — PROGRESS NOTES
"  SUBJECTIVE:   Javier Lovett is a 68 year old male who presents to clinic today for the following health issues:      ED/UC Followup:    Facility:  [facility]  Date of visit: 2/23/2019  Reason for visit: Afib  Current Status: Stable  Seeing Dr. Redding at [facility] for cardiology.     History of cardiomyopathy - no new symptoms.    Anemia during hospitalization - no specific trigger known.    Mood disorder - seeing psychiatry and stable.    Problem list and histories reviewed & adjusted, as indicated.  Additional history: as documented    Patient Active Problem List   Diagnosis     CARDIOVASCULAR SCREENING; LDL GOAL LESS THAN 100     Hypertrophic cardiomyopathy (H)     MR (mitral regurgitation)     Mood disorder (H)     Advance care planning     Atrial fibrillation (H)     History reviewed. No pertinent surgical history.    Social History     Tobacco Use     Smoking status: Never Smoker     Smokeless tobacco: Never Used   Substance Use Topics     Alcohol use: Yes     Alcohol/week: 0.0 oz     Comment: social     Family History   Problem Relation Age of Onset     Glaucoma No family hx of      Macular Degeneration No family hx of            Reviewed and updated as needed this visit by clinical staff  Tobacco  Allergies  Meds  Problems  Med Hx  Surg Hx  Fam Hx  Soc Hx        Reviewed and updated as needed this visit by Provider  Tobacco  Allergies  Meds  Problems  Med Hx  Surg Hx  Fam Hx         ROS:  Constitutional, HEENT, cardiovascular, pulmonary, gi and gu systems are negative, except as otherwise noted.    OBJECTIVE:     /69 (BP Location: Left arm, Patient Position: Chair, Cuff Size: Adult Regular)   Pulse 61   Temp 98.2  F (36.8  C) (Oral)   Ht 1.815 m (5' 11.46\")   Wt 86.7 kg (191 lb 3.2 oz)   SpO2 99%   BMI 26.33 kg/m    Body mass index is 26.33 kg/m .  GENERAL: healthy, alert and no distress  NECK: no adenopathy, no asymmetry, masses, or scars and thyroid normal to " palpation  RESP: lungs clear to auscultation - no rales, rhonchi or wheezes  CV: regular rates and rhythm, systolic flow murmur heard, peripheral pulses strong and no peripheral edema  ABDOMEN: soft, nontender, no hepatosplenomegaly, no masses and bowel sounds normal  MS: no gross musculoskeletal defects noted, no edema    Diagnostic Test Results:  none     ASSESSMENT/PLAN:     1. Paroxysmal atrial fibrillation (H)  Continue per cardiology    2. Anemia, unspecified type  Recheck today  - Hemoglobin    3. Hypertrophic cardiomyopathy (H)  Continue as per cardiology    4. Need for shingles vaccine    - ZOSTER VACCINE RECOMBINANT ADJUVANTED IM NJX  - ADMIN 1st VACCINE    5. Mood disorder (H)  Continue as per psychiatry.      FUTURE APPOINTMENTS:       - Follow-up visit in 6 months.    Rafaela Herr MD  [facility]

## 2019-03-04 NOTE — NURSING NOTE
Screening Questionnaire for Adult Immunization    Are you sick today?   No   Do you have allergies to medications, food, a vaccine component or latex?   No   Have you ever had a serious reaction after receiving a vaccination?   No   Do you have a long-term health problem with heart disease, lung disease, asthma, kidney disease, metabolic disease (e.g. diabetes), anemia, or other blood disorder?   No   Do you have cancer, leukemia, HIV/AIDS, or any other immune system problem?   No   In the past 3 months, have you taken medications that affect  your immune system, such as prednisone, other steroids, or anticancer drugs; drugs for the treatment of rheumatoid arthritis, Crohn s disease, or psoriasis; or have you had radiation treatments?   No   Have you had a seizure, or a brain or other nervous system problem?   No   During the past year, have you received a transfusion of blood or blood     products, or been given immune (gamma) globulin or antiviral drug?   No   For women: Are you pregnant or is there a chance you could become        pregnant during the next month?   No   Have you received any vaccinations in the past 4 weeks?   No     Immunization questionnaire answers were all negative.        Patient instructed to remain in clinic for 15 minutes afterwards, and to report any adverse reaction to me immediately.       Screening performed by Aubrey Mendoza on 3/4/2019 at 3:14 PM.

## 2019-03-04 NOTE — PATIENT INSTRUCTIONS
At [facility], we strive to deliver an exceptional experience to you, every time we see you.  If you receive a survey in the mail, please send us back your thoughts. We really do value your feedback.    Your care team:                            Family Medicine Internal Medicine   MD Navjot Pompa MD Shantel Branch-Fleming, MD Katya Georgiev PA-C Megan Hill, APRN ALEXIA Richard MD Pediatrics   Wilson Stubbs, GOOD [place], MD Ilana Osman APRN CNP   MD Tracy Kapoor MD Deborah Mielke, MD Krystin Morrison, APRN [facility]      Clinic hours: Monday - Thursday 7 am-7 pm; Fridays 7 am-5 pm.   Urgent care: Monday - Friday 11 am-9 pm; Saturday and Sunday 9 am-5 pm.  Pharmacy : Monday -Thursday 8 am-8 pm; Friday 8 am-6 pm; Saturday and Sunday 9 am-5 pm.     Clinic: [phone]   Pharmacy: [phone]

## 2019-03-05 NOTE — RESULT ENCOUNTER NOTE
Mr. Lovett,    Your hemoglobin is stable.    Please contact the clinic if you have additional questions.  Thank you.    Sincerely,    Rafaela Herr

## 2019-03-07 ENCOUNTER — TRANSFERRED RECORDS (OUTPATIENT)
Dept: HEALTH INFORMATION MANAGEMENT | Facility: CLINIC | Age: 69
End: 2019-03-07

## 2019-03-07 LAB — EJECTION FRACTION: 59

## 2019-03-14 LAB
AST SERPL-CCNC: 15 IU/L (ref 12–37)
GLUCOSE (EXTERNAL): 104 MG/DL (ref 74–106)

## 2019-04-02 ENCOUNTER — TRANSFERRED RECORDS (OUTPATIENT)
Dept: HEALTH INFORMATION MANAGEMENT | Facility: CLINIC | Age: 69
End: 2019-04-02

## 2019-04-02 LAB — TSH SERPL-ACNC: 0.8 UIU/ML (ref 0.36–3.74)

## 2019-04-16 ENCOUNTER — OFFICE VISIT (OUTPATIENT)
Dept: FAMILY MEDICINE | Facility: CLINIC | Age: 69
End: 2019-04-16
Payer: MEDICARE

## 2019-04-16 VITALS
HEART RATE: 78 BPM | SYSTOLIC BLOOD PRESSURE: 82 MMHG | RESPIRATION RATE: 18 BRPM | WEIGHT: 183.4 LBS | DIASTOLIC BLOOD PRESSURE: 58 MMHG | OXYGEN SATURATION: 98 % | BODY MASS INDEX: 25.68 KG/M2 | TEMPERATURE: 97.8 F | HEIGHT: 71 IN

## 2019-04-16 DIAGNOSIS — R26.9 GAIT ABNORMALITY: Primary | ICD-10-CM

## 2019-04-16 DIAGNOSIS — R25.1 TREMOR: ICD-10-CM

## 2019-04-16 DIAGNOSIS — I48.0 PAROXYSMAL ATRIAL FIBRILLATION (H): ICD-10-CM

## 2019-04-16 DIAGNOSIS — I95.9 HYPOTENSION, UNSPECIFIED HYPOTENSION TYPE: ICD-10-CM

## 2019-04-16 DIAGNOSIS — Z23 NEED FOR PROPHYLACTIC VACCINATION AGAINST STREPTOCOCCUS PNEUMONIAE (PNEUMOCOCCUS): ICD-10-CM

## 2019-04-16 PROCEDURE — 99214 OFFICE O/P EST MOD 30 MIN: CPT | Performed by: PHYSICIAN ASSISTANT

## 2019-04-16 RX ORDER — METOPROLOL SUCCINATE 25 MG/1
25 TABLET, EXTENDED RELEASE ORAL 2 TIMES DAILY
Qty: 120 TABLET | Refills: 1 | Status: SHIPPED | OUTPATIENT
Start: 2019-04-16 | End: 2019-06-21

## 2019-04-16 ASSESSMENT — MIFFLIN-ST. JEOR: SCORE: 1641.28

## 2019-04-16 ASSESSMENT — PAIN SCALES - GENERAL: PAINLEVEL: NO PAIN (0)

## 2019-04-16 NOTE — PROGRESS NOTES
"  SUBJECTIVE:   Javier Lovett is a 68 year old male who presents to clinic today for the following   health issues:      ED/UC Followup:    Facility:  [facility]   Date of visit: 04/13/2019  Reason for visit: Fall- and got stiches   Current Status: Sore in the ribs        Concern - Fall   Onset: Saturday     Description:   Patient fell on Saturday and was seen in the ER. Then fell again on Saturday and hit head and arm.     Intensity: mild    Therapies Tried and outcome: None  Patient is being treated for A-fib with Metoprolol blood thinner and cardioversion.   Lately his BP has been very low and patient gets occasional dizziness. However patient reports that his fall was not due to lightheadedness.   Patient reports that he has hard time controlling his legs, especially when he gets up from chair or couch-sometimes its hard to start walking and then the legs \"run ahead of me\" so he can't stop himself and that caused last fall.     Additional history: as documented    Reviewed  and updated as needed this visit by clinical staff  Tobacco  Allergies  Meds  Problems  Med Hx  Surg Hx  Fam Hx  Soc Hx        Reviewed and updated as needed this visit by Provider  Tobacco  Allergies  Meds  Problems  Med Hx  Surg Hx  Fam Hx         Patient Active Problem List   Diagnosis     CARDIOVASCULAR SCREENING; LDL GOAL LESS THAN 100     Hypertrophic cardiomyopathy (H)     MR (mitral regurgitation)     Mood disorder (H)     Advance care planning     Atrial fibrillation (H)     Heart murmur     History reviewed. No pertinent surgical history.    Social History     Tobacco Use     Smoking status: Never Smoker     Smokeless tobacco: Never Used   Substance Use Topics     Alcohol use: Yes     Alcohol/week: 0.0 oz     Comment: social     Family History   Problem Relation Age of Onset     Glaucoma No family hx of      Macular Degeneration No family hx of          Current Outpatient Medications   Medication Sig Dispense " "Refill     apixaban ANTICOAGULANT (ELIQUIS) 5 MG tablet Take 5 mg by mouth       Cholecalciferol (VITAMIN D PO) Take  by mouth daily.       CLINDAMYCIN HCL PO        GARLIC PO Take  by mouth daily.       metoprolol succinate ER (TOPROL-XL) 25 MG 24 hr tablet Take 1 tablet (25 mg) by mouth 2 times daily 120 tablet 1     Multiple Vitamins-Minerals (MULTI VITAMIN/MINERALS PO) Take  by mouth daily.       order for DME Equipment being ordered: BP monitor machine 1 Device 0     risperiDONE (RISPERDAL) 1 MG tablet TAKE 2 TABLETS BY MOUTH EVERY DAY 60 tablet 0     amiodarone (PACERONE/CODARONE) 200 MG tablet        bacitracin 500 UNIT/GM ophthalmic ointment Apply a small amount to right eyelid twice a day for 7 days. (Patient not taking: Reported on 3/4/2019) 1 Tube 1     Allergies   Allergen Reactions     Penicillins        ROS:  Constitutional, HEENT, cardiovascular, pulmonary, gi and gu systems are negative, except as otherwise noted.    OBJECTIVE:     BP (!) 82/58 (BP Location: Left arm, Patient Position: Sitting, Cuff Size: Adult Large)   Pulse 78   Temp 97.8  F (36.6  C) (Oral)   Resp 18   Ht 1.815 m (5' 11.46\")   Wt 83.2 kg (183 lb 6.4 oz)   SpO2 98%   BMI 25.25 kg/m    Body mass index is 25.25 kg/m .  GENERAL: alert, no distress and elderly  NECK: no adenopathy, no asymmetry, masses, or scars and thyroid normal to palpation  RESP: lungs clear to auscultation - no rales, rhonchi or wheezes  CV: regular rate and rhythm, normal S1 S2, no S3 or S4, no murmur, click or rub, no peripheral edema and peripheral pulses strong  ABDOMEN: soft, nontender, no hepatosplenomegaly, no masses and bowel sounds normal  MS: no gross musculoskeletal defects noted, no edema  NEURO: Normal strength and tone, sensory exam grossly normal, mentation intact, speech normal and gait abnormal: forward leaned body, shuffling feet, mild tremor in right leg and hand  PSYCH: mentation appears normal and affect flat    Diagnostic Test " Results:  none     ASSESSMENT/PLAN:       ICD-10-CM    1. Gait abnormality R26.9 NEUROLOGY ADULT REFERRAL   2. Tremor R25.1 NEUROLOGY ADULT REFERRAL   3. Hypotension, unspecified hypotension type I95.9 order for DME   4. Paroxysmal atrial fibrillation (H) I48.0 metoprolol succinate ER (TOPROL-XL) 25 MG 24 hr tablet   5. Need for prophylactic vaccination against Streptococcus pneumoniae (pneumococcus) Z23      1, 2.  Highly suspect Parkinson's  Patient needs to see neurology as soon as possible   3. Decrease Metoprolol to 25 mg twice a day   Follow up in 1 week.   4. Continue to see cardiology.     Dipti Flores PA-C  [facility]

## 2019-04-16 NOTE — PATIENT INSTRUCTIONS
Decrease Metoprolol to 25 mg twice a day vs 50 mg   Check BP qd  Follow up with primary care provider next Monday to recheck BP and     Make appointment with neurology to address gait changes and tremor

## 2019-04-22 ENCOUNTER — ANCILLARY PROCEDURE (OUTPATIENT)
Dept: GENERAL RADIOLOGY | Facility: CLINIC | Age: 69
End: 2019-04-22
Attending: PHYSICIAN ASSISTANT
Payer: MEDICARE

## 2019-04-22 ENCOUNTER — OFFICE VISIT (OUTPATIENT)
Dept: FAMILY MEDICINE | Facility: CLINIC | Age: 69
End: 2019-04-22
Payer: MEDICARE

## 2019-04-22 VITALS
TEMPERATURE: 98.3 F | OXYGEN SATURATION: 94 % | RESPIRATION RATE: 18 BRPM | HEIGHT: 71 IN | BODY MASS INDEX: 27.03 KG/M2 | WEIGHT: 193.1 LBS | DIASTOLIC BLOOD PRESSURE: 76 MMHG | SYSTOLIC BLOOD PRESSURE: 121 MMHG | HEART RATE: 75 BPM

## 2019-04-22 DIAGNOSIS — S99.922A INJURY OF TOE ON LEFT FOOT, INITIAL ENCOUNTER: ICD-10-CM

## 2019-04-22 DIAGNOSIS — S09.93XD: ICD-10-CM

## 2019-04-22 DIAGNOSIS — S99.922A INJURY OF TOE ON LEFT FOOT, INITIAL ENCOUNTER: Primary | ICD-10-CM

## 2019-04-22 DIAGNOSIS — L03.032 CELLULITIS OF TOE OF LEFT FOOT: ICD-10-CM

## 2019-04-22 PROCEDURE — 73660 X-RAY EXAM OF TOE(S): CPT | Mod: LT

## 2019-04-22 PROCEDURE — 99213 OFFICE O/P EST LOW 20 MIN: CPT | Performed by: PHYSICIAN ASSISTANT

## 2019-04-22 RX ORDER — SULFAMETHOXAZOLE/TRIMETHOPRIM 800-160 MG
1 TABLET ORAL 2 TIMES DAILY
Qty: 14 TABLET | Refills: 0 | Status: SHIPPED | OUTPATIENT
Start: 2019-04-22 | End: 2019-06-21

## 2019-04-22 RX ORDER — CEPHALEXIN 500 MG/1
500 CAPSULE ORAL 3 TIMES DAILY
Qty: 21 CAPSULE | Refills: 0 | Status: CANCELLED | OUTPATIENT
Start: 2019-04-22 | End: 2019-04-29

## 2019-04-22 ASSESSMENT — PAIN SCALES - GENERAL: PAINLEVEL: NO PAIN (0)

## 2019-04-22 ASSESSMENT — MIFFLIN-ST. JEOR: SCORE: 1685.28

## 2019-04-22 NOTE — PATIENT INSTRUCTIONS
Patient Education     Discharge Instructions for Cellulitis  You have been diagnosed with cellulitis. This is an infection in the deepest layer of the skin. In some cases, the infection also affects the muscle. Cellulitis is caused by bacteria. The bacteria can enter the body through broken skin. This can happen with a cut, scratch, animal bite, or an insect bite that has been scratched. You may have been treated in the hospital with antibiotics and fluids. You will likely be given a prescription for antibiotics to take at home. This sheet will help you take care of yourself at home.  Home care  When you are home:    Take the prescribed antibiotic medicine you are given as directed until it is gone. Take it even if you feel better. It treats the infection and stops it from returning. Not taking all the medicine can make future infections hard to treat.    Keep the infected area clean.    When possible, raise the infected area above the level of your heart. This helps keep swelling down.    Talk with your healthcare provider if you are in pain. Ask what kind of over-the-counter medicine you can take for pain.    Apply clean bandages as advised.    Take your temperature once a day for a week.    Wash your hands often to prevent spreading the infection.  In the future, wash your hands before and after you touch cuts, scratches, or bandages. This will help prevent infection.   When to call your healthcare provider  Call your healthcare provider immediately if you have any of the following:    Difficulty or pain when moving the joints above or below the infected area    Discharge or pus draining from the area    Fever of 100.4 F (38 C) or higher, or as directed by your healthcare provider    Pain that gets worse in or around the infected     Redness that gets worse in or around the infected area, particularly if the area of redness expands to a wider area    Shaking chills    Swelling of the infected area    Vomiting  "  Date Last Reviewed: 8/1/2016 2000-2018 [employer]. [address] [place] [address], [place] [address]. All rights reserved. This information is not intended as a substitute for professional medical care. Always follow your healthcare professional's instructions.           Patient Education     Stitches or Staple Removal  You were seen today for removal of your stitches or staples. Your wound is healing as expected. The wound has healed well enough that the stitches or staples can be removed. The wound will continue to heal for the next few months.  At this time there is no sign of infection.   Home care    If you have pain, take pain medicine as advised by your healthcare provider.     Keep your wound clean and protected by covering it with a bandage for the next week or so.     Wash your hands with soap and warm water before and after caring for your wound. This helps prevent infection.    Clean the wound gently with soap and warm water daily or as directed by your healthcare provider. Don't use iodine, alcohol, or other cleansers on the wound.  Gently pat it dry. Put on a new bandage, if needed. Don't reuse bandages.    If the area gets wet, gently pat it dry with a clean cloth. Replace the wet bandage with a dry one.    Check the wound daily for signs of infection. (These are listed under \"When to seek medical advice\" below.)    You may shower and bathe as usual. Swimming is now permitted.    Keep the wound out of prolonged direct sunlight. The new skin is very sensitive and can easily sunburn causing worse scarring.    Ask your provider about using over-the-counter scar removing creams if your wound is highly visible.  Follow-up care  Follow up with your healthcare provider as advised.  When to seek medical advice   Call your healthcare provider if any of the following occur:    Wound reopens or bleeds    Signs of an infection, such as:  ? Increasing redness or swelling around the wound  ? Increased " warmth from the wound  ? Worsening pain  ? Red streaking lines away from the wound  ? Fluid draining from the wound    Fever of 100.4 F (38 C) or higher, or as directed by your healthcare provider  Date Last Reviewed: 4/1/2018 2000-2018 [employer]. [address] [place] [address]. All rights reserved. This information is not intended as a substitute for professional medical care. Always follow your healthcare professional's instructions.

## 2019-04-22 NOTE — PROGRESS NOTES
SUBJECTIVE:   Javier Lovett is a 68 year old male who presents to clinic today for the following   health issues:      Concern - Bruised/Infected left big toe  Onset: x1 week    Description:   Left toe was bruised x1 week (after fall as above) and then yesterday 4/20/2019 the big toe started oozing a pus like  Liquid. No XR done in ED per family    Intensity: moderate    Progression of Symptoms:  Only notice if someone bumps it     Accompanying Signs & Symptoms:  Pus like liquid, redness     Previous history of similar problem:   None    Precipitating factors:   Worsened by: Being bumped     Alleviating factors:  Improved by: Keeping a sock on and not being bumped     Therapies Tried and outcome: Warm water soaked, kind of helped.     Patient is also getting sutures removed.  From ED visit for mechanical fall 4/13/19              Allergies   Allergen Reactions     Penicillins        History reviewed. No pertinent past medical history.      Current Outpatient Medications on File Prior to Visit:  amiodarone (PACERONE/CODARONE) 200 MG tablet    apixaban ANTICOAGULANT (ELIQUIS) 5 MG tablet Take 5 mg by mouth   Cholecalciferol (VITAMIN D PO) Take  by mouth daily.   CLINDAMYCIN HCL PO    GARLIC PO Take  by mouth daily.   metoprolol succinate ER (TOPROL-XL) 25 MG 24 hr tablet Take 1 tablet (25 mg) by mouth 2 times daily   Multiple Vitamins-Minerals (MULTI VITAMIN/MINERALS PO) Take  by mouth daily.   order for DME Equipment being ordered: BP monitor machine   risperiDONE (RISPERDAL) 1 MG tablet TAKE 2 TABLETS BY MOUTH EVERY DAY   bacitracin 500 UNIT/GM ophthalmic ointment Apply a small amount to right eyelid twice a day for 7 days. (Patient not taking: Reported on 3/4/2019)     No current facility-administered medications on file prior to visit.     Social History     Tobacco Use     Smoking status: Never Smoker     Smokeless tobacco: Never Used   Substance Use Topics     Alcohol use: Yes     Alcohol/week: 0.0 oz      "Comment: social     Drug use: No       ROS:  General: negative for fever  SKIN: + as above  <USC toe inj as above    Physcial Exam:  /76 (BP Location: Right arm, Patient Position: Sitting, Cuff Size: Adult Regular)   Pulse 75   Temp 98.3  F (36.8  C) (Oral)   Resp 18   Ht 1.815 m (5' 11.46\")   Wt 87.6 kg (193 lb 1.6 oz)   SpO2 94%   BMI 26.59 kg/m      GENERAL: alert, no acute distress  EYES: conjunctival clear  RESP: Regular breathing rate  NEURO: awake .  SKIN: left great toe distal half is red, around the nail bed is slight oozing, no abscess  Chin-  scabbe juan jose healin g lac with 4 intact sutures    ASSESSMENT:    ICD-10-CM    1. Injury of toe on left foot, initial encounter S99.922A XR Toe Left G/E 2 Views   2. Chin injury, subsequent encounter S09.93XD REMOVAL OF SUTURES   3. Cellulitis of toe of left foot L03.032 sulfamethoxazole-trimethoprim (BACTRIM DS) 800-160 MG tablet       PLAN: verbal consent rec'd.  Sutures removed with sterile suture removal kit and bacitracin applied, Patient tolerated well. See today's orders.  Follow-up with primary clinic if not improving.  Advised about symptoms which might herald more serious problems.         "

## 2019-05-10 ENCOUNTER — OFFICE VISIT (OUTPATIENT)
Dept: NEUROLOGY | Facility: CLINIC | Age: 69
End: 2019-05-10
Attending: PHYSICIAN ASSISTANT
Payer: MEDICARE

## 2019-05-10 VITALS
WEIGHT: 184 LBS | SYSTOLIC BLOOD PRESSURE: 105 MMHG | HEART RATE: 60 BPM | OXYGEN SATURATION: 98 % | DIASTOLIC BLOOD PRESSURE: 69 MMHG | BODY MASS INDEX: 25.34 KG/M2

## 2019-05-10 DIAGNOSIS — G20.C PARKINSONISM, UNSPECIFIED PARKINSONISM TYPE (H): Primary | ICD-10-CM

## 2019-05-10 LAB — VIT B12 SERPL-MCNC: 316 PG/ML (ref 193–986)

## 2019-05-10 PROCEDURE — 82390 ASSAY OF CERULOPLASMIN: CPT | Performed by: PSYCHIATRY & NEUROLOGY

## 2019-05-10 PROCEDURE — 36415 COLL VENOUS BLD VENIPUNCTURE: CPT | Performed by: PSYCHIATRY & NEUROLOGY

## 2019-05-10 PROCEDURE — 82607 VITAMIN B-12: CPT | Performed by: PSYCHIATRY & NEUROLOGY

## 2019-05-10 PROCEDURE — 99205 OFFICE O/P NEW HI 60 MIN: CPT | Performed by: PSYCHIATRY & NEUROLOGY

## 2019-05-10 RX ORDER — AMIODARONE HYDROCHLORIDE 200 MG/1
200 TABLET ORAL DAILY
COMMUNITY
End: 2022-08-01

## 2019-05-10 ASSESSMENT — UNIFIED PARKINSONS DISEASE RATING SCALE (UPDRS)
TOETAPPING_RIGHT: NORMAL
ARISING_CHAIR: MILD:  PUSHES SELF UP FROM ARMS OF CHAIR WITHOUT DIFFICULTY.
RIGIDITY_LUE: MILD: RIGIDITY DETECTED WITHOUT THE ACTIVATION MANEUVER.  FULL RANGE OF MOTION IS EASILY ACHIEVED.
TOTAL_SCORE_LEFT: 11
POSTURAL_STABILITY: MODERATE: STANDS SAFELY, BUT WITH ABSENCE OF POSTURAL RESPONSE,  FALLS IF NOT CAUGHT BY EXAMINER.
FINGER_TAPPING_LEFT: MILD: ANY OF THE FOLLOWING: A) 3 TO 5 INTERRUPTIONS DURING TAPPING B) MILD SLOWING C) THE AMPLITUDE DECREMENTS MIDWAY IN THE 10-MOVEMENT SEQUENCE
RIGIDITY_LLE: MODERATE: RIGIDITY DETECTED WITHOUT THE ACTIVATION MANEUVER. FULL RANGE OF MOTION IS ACHIEVED WITH EFFORT.
SPEECH: SLIGHT: LOSS OF MODULATION, DICTION OR VOLUME, BUT STILL ALL WORDS EASY TO UNDERSTAND.
CONSTANCY_TREMOR_ATREST: NORMAL: NO TREMOR.
RIGIDITY_NECK: MODERATE: RIGIDITY DETECTED WITHOUT THE ACTIVATION MANEUVER. FULL RANGE OF MOTION IS ACHIEVED WITH EFFORT.
LEG_AGILITY_RIGHT: NORMAL
AMPLITUDE_LUE: NORMAL: NO TREMOR.
POSTURE: 3 MODERATE.  STOOPED POSTURE, SCOLIOSIS, OR LEANING TO ONE SIDE THAT CANNOT BE CORRECTED VOLITIONALLY TO A NORMAL POSTURE BY THE PATIENT.
AMPLITUDE_RLE: NORMAL: NO TREMOR.
FREEZING_GAIT: NORMAL
HANDMOVEMENTS_LEFT: MILD: ANY OF THE FOLLOWING: A) 3 TO 5 INTERRUPTIONS DURING TAPPING B) MILD SLOWING C) THE AMPLITUDE DECREMENTS MIDWAY IN THE 10-MOVEMENT SEQUENCE
HANDMOVEMENTS_RIGHT: NORMAL
TOTAL_SCORE: 5
AMPLITUDE_LLE: NORMAL: NO TREMOR.
GAIT: MODERATE: REQUIRES AN ASSISTANCE DEVICE FOR SAFE WALKING (WALKING STICK, WALKER) BUT NOT A PERSON.
AMPLITUDE_LIP_JAW: NORMAL: NO TREMOR.
PRONATION_SUPINATION_LEFT: MILD: ANY OF THE FOLLOWING: A) 3 TO 5 INTERRUPTIONS DURING TAPPING B) MILD SLOWING C) THE AMPLITUDE DECREMENTS MIDWAY IN THE 10-MOVEMENT SEQUENCE
LEG_AGILITY_LEFT: NORMAL
PRONATION_SUPINATION_RIGHT: NORMAL
RIGIDITY_RLE: MODERATE: RIGIDITY DETECTED WITHOUT THE ACTIVATION MANEUVER. FULL RANGE OF MOTION IS ACHIEVED WITH EFFORT.
AXIAL_SCORE: 16
AMPLITUDE_RUE: NORMAL: NO TREMOR.
FACIAL_EXPRESSION: SLIGHT: MINIMAL MASKED FACIES MANIFESTED ONLY BY DECREASED FREQUENCY OF BLINKING.
TOTAL_SCORE: 32
RIGIDITY_RUE: MILD: RIGIDITY DETECTED WITHOUT THE ACTIVATION MANEUVER.  FULL RANGE OF MOTION IS EASILY ACHIEVED.
SPONTANEITY_OF_MOVEMENT: 0: NORMAL.  NO PROBLEMS.
FINGER_TAPPING_RIGHT: NORMAL
TOETAPPING_LEFT: NORMAL

## 2019-05-10 ASSESSMENT — PAIN SCALES - GENERAL: PAINLEVEL: MODERATE PAIN (5)

## 2019-05-10 NOTE — PATIENT INSTRUCTIONS
1.  Get bloodwork here at [facility] today  2.  We will schedule an MRI of your brain and bring you back to look at it   3.  Please see your Psychiatrist as soon as possible and bring my note  4.  Will see you back 1 month or sooner

## 2019-05-10 NOTE — NURSING NOTE
Javier Lovett's goals for this visit include:   Chief Complaint   Patient presents with     Consult     tremors/ gait issues  RP: Dipti Flores PA-C       He requests these members of his care team be copied on today's visit information: no    PCP: Rafaela Maracno    Referring Provider:  Dipti Flores PA-C  [address] [place] [address]    /69 (BP Location: Left arm, Patient Position: Sitting, Cuff Size: Adult Regular)   Pulse 60   Wt 83.5 kg (184 lb)   SpO2 98%   BMI 25.34 kg/m      Do you need any medication refills at today's visit? No    Carmela Bowen LPN

## 2019-05-10 NOTE — LETTER
5/10/2019         RE: Javier Lovett  [address] [facility] [address]        Dear Colleague,    Thank you for referring your patient, Javier Lovett, to the [facility]. Please see a copy of my visit note below.    Department of Neurology  Movement Disorders Division   Initial Clinic Evaluation     Patient: Javier Lovett   MRN: [id number]   : 1950   Date of Visit: 5/10/2019     Referring Physician: Sandra    Reason for Referral: Parkinsonism    Impression:  1.  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  He may also have the new onset of Parkinson's disease.  Amiodarone has been associated with parkinsonism but usually with severe tremor.  Finally he could have an atypical parkinsonianism, such as multiple system atrophy as he has incontinence.    2.  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  Atrial fibrillation on anticoagulation and amiodarone.    Recommendations:  1.  The most important things for this patient to see his psychiatrist.  He appears to have a need for an antipsychotic medication as the last time he came off he developed paranoia and delusions of persecution.  His parkinsonism however is disabling and puts him at risk for injurious falls.  The options are to reduce the Risperdal, substitute quetiapine or substitute Clozaril.    2.  We will check an MRI head scan, ceruloplasmin and B12 to look for secondary causes of parkinsonism    3.  See back after MRI of brain      Please call or write with questions or concerns,    Sincerely yours,    Lorenzo Crisostomo MD      History of Present Illness  Mr. Lovett is a 68 year old male who is left-handed.  He has been retired to years from steel work.    He comes with his daughter Tiffanie.    The patient has a longtime psychiatric disorder.  He has occasional delusions of  persecution and paranoia.  He is done well on Risperdal.  He was taking 2 mg a day.  Recently he was complaining of trouble poor sleep and he went up to Risperdal 4 mg a day.    Around the turn of the year he began to have festination.  He says his feet move faster than his body can move.  He accelerates forward.  He could overcome this did not fall until April of this year.  He then fell forward and hit his face.  He could get up.  Paramedics were called and he was hospitalized at [facility].  A CT scan of the head was done there and was reported normal.  He went to live with his daughters.  He rolled out of bed and fell on the floor was.  At [facility]'s house he also fell out of bed.  His walking remains stiff and imbalanced.    At their house he has been incontinent of urine.  He cannot get out of bed rapidly enough and they put on depends for convenience.  Generally he can maintain continence.  He cannot dress himself.  He can put on his pants or issues.  He denies any anosmia.  He has no REM behavior disorder.  He has no restless leg syndrome.  There is no family history of parkinsonism.  He denies history of head injury or toxic exposure.  He has not really had tremor.  There was one instance where there may have been a brief episode of leg tremor but this is unclear.  He has no jerking such as myoclonus.  He has difficulty getting out of a chair.  He has difficulty turning over in bed.  His handwriting has become small.  He does not drool.  His speech is normal.  He walks in a stooped fashion and shuffles.  The festination is his major complaint as noted above.  Cognitively he feels normal.  He has no hallucination either visual or auditory.  He has longtime depression and anxiety.  He is just been switched over to the panel for a new psychiatrist and has not seen his new psychiatrist yet.  He has no orthostatic hypotension.  He has no diplopia or visual changes.        Past Medical History:   No past  medical history on file.    Past Surgical History:   No past surgical history on file.    Medications:  Current Outpatient Medications   Medication Sig Dispense Refill     amiodarone (PACERONE/CODARONE) 200 MG tablet Take 200 mg by mouth daily       apixaban ANTICOAGULANT (ELIQUIS) 5 MG tablet Take 5 mg by mouth       Cholecalciferol (VITAMIN D PO) Take 2,000 Int'l Units by mouth daily        metoprolol succinate ER (TOPROL-XL) 25 MG 24 hr tablet Take 1 tablet (25 mg) by mouth 2 times daily 120 tablet 1     Multiple Vitamins-Minerals (MULTI VITAMIN/MINERALS PO) Take  by mouth daily.       order for DME Equipment being ordered: BP monitor machine 1 Device 0     risperiDONE (RISPERDAL) 1 MG tablet TAKE 2 TABLETS BY MOUTH EVERY DAY (Patient taking differently: TAKE 4 TABLETS BY MOUTH EVERY DAY) 60 tablet 0     amiodarone (PACERONE/CODARONE) 200 MG tablet        bacitracin 500 UNIT/GM ophthalmic ointment Apply a small amount to right eyelid twice a day for 7 days. (Patient not taking: Reported on 3/4/2019) 1 Tube 1     CLINDAMYCIN HCL PO Prior to dental work       GARLIC PO Take  by mouth daily.            Movement Disorder-related Medications                                                                                                                                                             Allergies: is allergic to penicillins.    Social History:   Social History     Socioeconomic History     Marital status: Single     Spouse name: Not on file     Number of children: Not on file     Years of education: Not on file     Highest education level: Not on file   Occupational History     Not on file   Social Needs     Financial resource strain: Not on file     Food insecurity:     Worry: Not on file     Inability: Not on file     Transportation needs:     Medical: Not on file     Non-medical: Not on file   Tobacco Use     Smoking status: Never Smoker     Smokeless tobacco: Never Used   Substance and Sexual Activity      Alcohol use: Yes     Alcohol/week: 0.0 oz     Comment: social     Drug use: No     Sexual activity: Yes     Partners: Female   Lifestyle     Physical activity:     Days per week: Not on file     Minutes per session: Not on file     Stress: Not on file   Relationships     Social connections:     Talks on phone: Not on file     Gets together: Not on file     Attends Restorationism service: Not on file     Active member of club or organization: Not on file     Attends meetings of clubs or organizations: Not on file     Relationship status: Not on file     Intimate partner violence:     Fear of current or ex partner: Not on file     Emotionally abused: Not on file     Physically abused: Not on file     Forced sexual activity: Not on file   Other Topics Concern     Parent/sibling w/ CABG, MI or angioplasty before 65F 55M? Not Asked   Social History Narrative     Not on file       Family History:  Family History   Problem Relation Age of Onset     Glaucoma No family hx of      Macular Degeneration No family hx of        ROS:  General:  Fever : no, Chills: no, Sweats: no, Fatigue: no, ,Weight loss: no  Cardiovascular  Chest Pains: no, Palpitations: no, Edema: no, Shortness of breath: no Afib on amidarone and anticoagulation  Ablation planned in June Respiratory  Cough: no  Gastrointestinal  Nausea: no, Vomiting: no, Diarrhea: no, Constipation: no  Genitourinary  Dysuria: no, Frequency: no, Urgency: no,  Nocturia: YES, Incontinence: YES   Musculoskeletal  Back pain: no, Neck Pain: no  Joint pain, swelling, redness: no, Stiffness: no  Skin  Rash: no, Itching: no,  Suspicious lesions: no  Psychiatric  Depression: no, Anxiety/Panic: no  Endocrine  Cold intolerance: no, Heat intolerance: no, Excessive thirst: no  Hematologic/LymphatiAbnormal bruising, bleeding: no ,Enlarged lymph nodes: {.:[id number]  Allergic/Immunologic  Hives (Urticaria): no, HIV exposure: no    Neurologic  Visual loss: no, Double vision: no, Headache: no, Loss of  consciousness:  no, Seizure: no, Fainting (syncope): no, Dysarthria: no, Dysphagia:  no, Vertigo:  no, Weakness: no, Atrophy: no, Twitches: no, Lhermitte's: no, Numbness or tingling: no, Handwriting change: YES, Tremors: YES, Involuntary movements: no, Imbalance: YES, Abnormal gait:  YES, Falls :no, Memory loss: no, RBD: no, Sleep disorder: no, Hallucination: no, Loss of concentration: no,  Behavioral change: no,  Loss of motivation: no      Comprehensive Neurologic Exam    Mental Status Exam   The patient is alert, oriented and exhibits no difficulty with cognition or memory.  A formal short test of mental status was performed.     Neurovascular         Speech and Language   Right Left     Carotid Bruit Absent Absent  Speech is normal.   Dorsalis Pedis Pulse Normal Normal  Description   Posterior Tibial Pulse Normal Normal  Language is normal.     Cranial Nerve Exam                 Right Left   Right Left   II                                        Normal Normal  Hearing (VIII) Normal Normal      III, IV, V!                   Normal Normal  Nystagmus (VIII) Normal Normal   EOM description                              Gag (IX, X) Normal Normal   Facial Sensation (V) Normal Normal  SCM (XI) Normal Normal   Muscles of Mastication (V) Normal Normal  Trapezius (XI) Normal Normal   Facial Strength (VII) Normal Normal  Tongue (XII) Normal Normal     Motor Exam  Strength (*/5 grading)  Muscle                  Right Left  Muscle Right Left   Frontalis                                           Normal Normal  Iliopsoas Normal    Normal          Orbicularis Oculi                     Normal Normal  Quadrideps Normal    Normal        Orbicularis Oris                         Normal Normal  Anterior Tibial Normal Normal      Deltoid Normal Normal  Gastrocnemius Normal    Normal   Biceps Normal Normal  Extensor Hallucis Longus Normal    Normal   Triceps Normal Normal  Toe Extensors Normal    Normal   EDC Normal Normal  Toe Flexor  Normal    Normal   Finger Flexors Normal Normal  Other Normal Normal   First Dorsal Interosseous Normal Normal  Other Normal Normal   Hypothenar Normal Normal  Other Normal Normal   Thenar Normal Normal  Other Normal Normal     Reflexes      Tone   Right Left   Right Left   Biceps Normal Normal  Spasticity (S)  Rigidity (R)     Triceps Normal Normal  Neck     Brachioradialis Normal Normal  Arm     Quadriceps Normal Normal  Leg     Ankle Normal Normal       Babkinski Flexor Flexor         AMR       Coordination   Right Left   Right Left   Fingers Normal Normal  Finger nose finger Normal Normal   Hand Normal Normal  Drift Normal Normal   Leg Normal Normal  Heel Reyes Normal Normal   Foot Normal Normal  Other     Other           NO buccal oral dyskinesia    UPDRS Values 5/10/2019   Speech 1   Facial Expression 1   Rigidity Neck 3   Rigidity RUE 2   Rigidity LUE 2   Rigidity RLE 3   Rigidity LLE 3   Finger Taps R 0   Finger Taps L 2   Hand Mvt R 0   Hand Mvt L 2   Pron-/Supinate R 0   Pron-/Supinate L 2   Toe Tap R 0   Toe Tap L 0   Leg Agility R 0   Leg Agility L 0   Arise From Chair 2   Gait 3   Gait Freezing 0   Postural Stability 3   Posture 3   Global Spont Mvt 0   Postural Tremor RUE 0   Postural Tremor LUE 0   Kinetic Tremor RUE 0   Kinetic Tremor LUE 0   Rest Tremor RUE 0   Rest Tremor LUE 0   Rest Tremor RLE 0   Rest Tremor LLE 0   Rest Tremor Lip/Jaw 0   Rest Tremor Constancy 0   Total Right 5   Total Left 11   Axial Total 16   Total 32            Coding statement:     Duration of  Services: face-to-face 70 min.   Greater than 50% of this visit was spent in counseling and coordination of care.                         Again, thank you for allowing me to participate in the care of your patient.        Sincerely,        Lorenzo Crisostomo MD

## 2019-05-10 NOTE — PROGRESS NOTES
Department of Neurology  Movement Disorders Division   Initial Clinic Evaluation     Patient: Javier Lovett   MRN: [id number]   : 1950   Date of Visit: 5/10/2019     Referring Physician: Sandra    Reason for Referral: Parkinsonism    Impression:  1.  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  He may also have the new onset of Parkinson's disease.  Amiodarone has been associated with parkinsonism but usually with severe tremor.  Finally he could have an atypical parkinsonianism, such as multiple system atrophy as he has incontinence.    2.  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  Atrial fibrillation on anticoagulation and amiodarone.    Recommendations:  1.  The most important things for this patient to see his psychiatrist.  He appears to have a need for an antipsychotic medication as the last time he came off he developed paranoia and delusions of persecution.  His parkinsonism however is disabling and puts him at risk for injurious falls.  The options are to reduce the Risperdal, substitute quetiapine or substitute Clozaril.    2.  We will check an MRI head scan, ceruloplasmin and B12 to look for secondary causes of parkinsonism    3.  See back after MRI of brain      Please call or write with questions or concerns,    Sincerely yours,    Lorenzo Crisostomo MD      History of Present Illness  Mr. Lovett is a 68 year old male who is left-handed.  He has been retired to years from steel work.    He comes with his daughter Tiffanie.    The patient has a longtime psychiatric disorder.  He has occasional delusions of persecution and paranoia.  He is done well on Risperdal.  He was taking 2 mg a day.  Recently he was complaining of trouble poor sleep and he went up to Risperdal 4 mg a day.    Around the turn of the year he began to have festination.  He says his feet  move faster than his body can move.  He accelerates forward.  He could overcome this did not fall until April of this year.  He then fell forward and hit his face.  He could get up.  Paramedics were called and he was hospitalized at [facility].  A CT scan of the head was done there and was reported normal.  He went to live with his daughters.  He rolled out of bed and fell on the floor was.  At Yvonne's house he also fell out of bed.  His walking remains stiff and imbalanced.    At their house he has been incontinent of urine.  He cannot get out of bed rapidly enough and they put on depends for convenience.  Generally he can maintain continence.  He cannot dress himself.  He can put on his pants or issues.  He denies any anosmia.  He has no REM behavior disorder.  He has no restless leg syndrome.  There is no family history of parkinsonism.  He denies history of head injury or toxic exposure.  He has not really had tremor.  There was one instance where there may have been a brief episode of leg tremor but this is unclear.  He has no jerking such as myoclonus.  He has difficulty getting out of a chair.  He has difficulty turning over in bed.  His handwriting has become small.  He does not drool.  His speech is normal.  He walks in a stooped fashion and shuffles.  The festination is his major complaint as noted above.  Cognitively he feels normal.  He has no hallucination either visual or auditory.  He has longtime depression and anxiety.  He is just been switched over to the panel for a new psychiatrist and has not seen his new psychiatrist yet.  He has no orthostatic hypotension.  He has no diplopia or visual changes.        Past Medical History:   No past medical history on file.    Past Surgical History:   No past surgical history on file.    Medications:  Current Outpatient Medications   Medication Sig Dispense Refill     amiodarone (PACERONE/CODARONE) 200 MG tablet Take 200 mg by mouth daily        apixaban ANTICOAGULANT (ELIQUIS) 5 MG tablet Take 5 mg by mouth       Cholecalciferol (VITAMIN D PO) Take 2,000 Int'l Units by mouth daily        metoprolol succinate ER (TOPROL-XL) 25 MG 24 hr tablet Take 1 tablet (25 mg) by mouth 2 times daily 120 tablet 1     Multiple Vitamins-Minerals (MULTI VITAMIN/MINERALS PO) Take  by mouth daily.       order for DME Equipment being ordered: BP monitor machine 1 Device 0     risperiDONE (RISPERDAL) 1 MG tablet TAKE 2 TABLETS BY MOUTH EVERY DAY (Patient taking differently: TAKE 4 TABLETS BY MOUTH EVERY DAY) 60 tablet 0     amiodarone (PACERONE/CODARONE) 200 MG tablet        bacitracin 500 UNIT/GM ophthalmic ointment Apply a small amount to right eyelid twice a day for 7 days. (Patient not taking: Reported on 3/4/2019) 1 Tube 1     CLINDAMYCIN HCL PO Prior to dental work       GARLIC PO Take  by mouth daily.            Movement Disorder-related Medications                                                                                                                                                             Allergies: is allergic to penicillins.    Social History:   Social History     Socioeconomic History     Marital status: Single     Spouse name: Not on file     Number of children: Not on file     Years of education: Not on file     Highest education level: Not on file   Occupational History     Not on file   Social Needs     Financial resource strain: Not on file     Food insecurity:     Worry: Not on file     Inability: Not on file     Transportation needs:     Medical: Not on file     Non-medical: Not on file   Tobacco Use     Smoking status: Never Smoker     Smokeless tobacco: Never Used   Substance and Sexual Activity     Alcohol use: Yes     Alcohol/week: 0.0 oz     Comment: social     Drug use: No     Sexual activity: Yes     Partners: Female   Lifestyle     Physical activity:     Days per week: Not on file     Minutes per session: Not on file     Stress: Not on  file   Relationships     Social connections:     Talks on phone: Not on file     Gets together: Not on file     Attends Jain service: Not on file     Active member of club or organization: Not on file     Attends meetings of clubs or organizations: Not on file     Relationship status: Not on file     Intimate partner violence:     Fear of current or ex partner: Not on file     Emotionally abused: Not on file     Physically abused: Not on file     Forced sexual activity: Not on file   Other Topics Concern     Parent/sibling w/ CABG, MI or angioplasty before 65F 55M? Not Asked   Social History Narrative     Not on file       Family History:  Family History   Problem Relation Age of Onset     Glaucoma No family hx of      Macular Degeneration No family hx of        ROS:  General:  Fever : no, Chills: no, Sweats: no, Fatigue: no, ,Weight loss: no  Cardiovascular  Chest Pains: no, Palpitations: no, Edema: no, Shortness of breath: no Afib on amidarone and anticoagulation  Ablation planned in June Respiratory  Cough: no  Gastrointestinal  Nausea: no, Vomiting: no, Diarrhea: no, Constipation: no  Genitourinary  Dysuria: no, Frequency: no, Urgency: no,  Nocturia: YES, Incontinence: YES   Musculoskeletal  Back pain: no, Neck Pain: no  Joint pain, swelling, redness: no, Stiffness: no  Skin  Rash: no, Itching: no,  Suspicious lesions: no  Psychiatric  Depression: no, Anxiety/Panic: no  Endocrine  Cold intolerance: no, Heat intolerance: no, Excessive thirst: no  Hematologic/LymphatiAbnormal bruising, bleeding: no ,Enlarged lymph nodes: {[id number]  Allergic/Immunologic  Hives (Urticaria): no, HIV exposure: no    Neurologic  Visual loss: no, Double vision: no, Headache: no, Loss of consciousness:  no, Seizure: no, Fainting (syncope): no, Dysarthria: no, Dysphagia:  no, Vertigo:  no, Weakness: no, Atrophy: no, Twitches: no, Lhermitte's: no, Numbness or tingling: no, Handwriting change: YES, Tremors: YES, Involuntary movements:  no, Imbalance: YES, Abnormal gait:  YES, Falls :no, Memory loss: no, RBD: no, Sleep disorder: no, Hallucination: no, Loss of concentration: no,  Behavioral change: no,  Loss of motivation: no      Comprehensive Neurologic Exam    Mental Status Exam   The patient is alert, oriented and exhibits no difficulty with cognition or memory.  A formal short test of mental status was performed.     Neurovascular         Speech and Language   Right Left     Carotid Bruit Absent Absent  Speech is normal.   Dorsalis Pedis Pulse Normal Normal  Description   Posterior Tibial Pulse Normal Normal  Language is normal.     Cranial Nerve Exam                 Right Left   Right Left   II                                        Normal Normal  Hearing (VIII) Normal Normal      III, IV, V!                   Normal Normal  Nystagmus (VIII) Normal Normal   EOM description                              Gag (IX, X) Normal Normal   Facial Sensation (V) Normal Normal  SCM (XI) Normal Normal   Muscles of Mastication (V) Normal Normal  Trapezius (XI) Normal Normal   Facial Strength (VII) Normal Normal  Tongue (XII) Normal Normal     Motor Exam  Strength (*/5 grading)  Muscle                  Right Left  Muscle Right Left   Frontalis                                           Normal Normal  Iliopsoas Normal    Normal          Orbicularis Oculi                     Normal Normal  Quadrideps Normal    Normal        Orbicularis Oris                         Normal Normal  Anterior Tibial Normal Normal      Deltoid Normal Normal  Gastrocnemius Normal    Normal   Biceps Normal Normal  Extensor Hallucis Longus Normal    Normal   Triceps Normal Normal  Toe Extensors Normal    Normal   EDC Normal Normal  Toe Flexor Normal    Normal   Finger Flexors Normal Normal  Other Normal Normal   First Dorsal Interosseous Normal Normal  Other Normal Normal   Hypothenar Normal Normal  Other Normal Normal   Thenar Normal Normal  Other Normal Normal     Reflexes      Tone    Right Left   Right Left   Biceps Normal Normal  Spasticity (S)  Rigidity (R)     Triceps Normal Normal  Neck     Brachioradialis Normal Normal  Arm     Quadriceps Normal Normal  Leg     Ankle Normal Normal       Babkinski Flexor Flexor         AMR       Coordination   Right Left   Right Left   Fingers Normal Normal  Finger nose finger Normal Normal   Hand Normal Normal  Drift Normal Normal   Leg Normal Normal  Heel Reyes Normal Normal   Foot Normal Normal  Other     Other           NO buccal oral dyskinesia    UPDRS Values 5/10/2019   Speech 1   Facial Expression 1   Rigidity Neck 3   Rigidity RUE 2   Rigidity LUE 2   Rigidity RLE 3   Rigidity LLE 3   Finger Taps R 0   Finger Taps L 2   Hand Mvt R 0   Hand Mvt L 2   Pron-/Supinate R 0   Pron-/Supinate L 2   Toe Tap R 0   Toe Tap L 0   Leg Agility R 0   Leg Agility L 0   Arise From Chair 2   Gait 3   Gait Freezing 0   Postural Stability 3   Posture 3   Global Spont Mvt 0   Postural Tremor RUE 0   Postural Tremor LUE 0   Kinetic Tremor RUE 0   Kinetic Tremor LUE 0   Rest Tremor RUE 0   Rest Tremor LUE 0   Rest Tremor RLE 0   Rest Tremor LLE 0   Rest Tremor Lip/Jaw 0   Rest Tremor Constancy 0   Total Right 5   Total Left 11   Axial Total 16   Total 32            Coding statement:     Duration of  Services: face-to-face 70 min.   Greater than 50% of this visit was spent in counseling and coordination of care.

## 2019-05-13 LAB — CERULOPLASMIN SERPL-MCNC: 29 MG/DL (ref 23–53)

## 2019-05-17 ENCOUNTER — MEDICAL CORRESPONDENCE (OUTPATIENT)
Dept: HEALTH INFORMATION MANAGEMENT | Facility: CLINIC | Age: 69
End: 2019-05-17

## 2019-05-17 ENCOUNTER — ANCILLARY PROCEDURE (OUTPATIENT)
Dept: MRI IMAGING | Facility: CLINIC | Age: 69
End: 2019-05-17
Attending: PSYCHIATRY & NEUROLOGY
Payer: MEDICARE

## 2019-05-17 DIAGNOSIS — G20.C PARKINSONISM, UNSPECIFIED PARKINSONISM TYPE (H): ICD-10-CM

## 2019-05-17 PROCEDURE — 70551 MRI BRAIN STEM W/O DYE: CPT | Performed by: RADIOLOGY

## 2019-05-21 ENCOUNTER — DOCUMENTATION ONLY (OUTPATIENT)
Dept: OTHER | Facility: CLINIC | Age: 69
End: 2019-05-21

## 2019-05-28 ENCOUNTER — TELEPHONE (OUTPATIENT)
Dept: FAMILY MEDICINE | Facility: CLINIC | Age: 69
End: 2019-05-28

## 2019-05-28 NOTE — TELEPHONE ENCOUNTER
Received request for med list, last physical, last office date, face to face form.... Faxed what we had which was, Med list, no physical on file, last clinic visit date and notes and no face to face form to [facility], [phone], right fax confirmed at 1:53 pm today, 5/28/19.  Melanie Lopez MA/  For Teams Spirit and Ludivina

## 2019-05-29 ENCOUNTER — DOCUMENTATION ONLY (OUTPATIENT)
Dept: OTHER | Facility: CLINIC | Age: 69
End: 2019-05-29

## 2019-05-30 NOTE — TELEPHONE ENCOUNTER
Spoke to Celina and she states that she needs verbal orders for patient to have PT and OT evaluation and treatment to start tomorrow, 5/31/19.  Melanie Lopez MA/  For Teams Trever and Ludivina

## 2019-05-30 NOTE — TELEPHONE ENCOUNTER
Verbal orders given to approve the requested services below per the 'Home Care, Assisted Living, or Nursing Home Evaluations and Treatments [facility] Policy'.    Amna Kessler RN

## 2019-06-03 NOTE — TELEPHONE ENCOUNTER
Reason for Call:  Other Home Care    Detailed comments: Pt's Home Care RN calling to follow up on Home Care Orders and would like Dr. Ana Herr to call in verbal orders as soon as possible as Pt has a couple of Home Care appointments this morning.    Phone Number [facility]  can be reached at: Other phone number:  [phone]    Best Time: anytime    Can we leave a detailed message on this number? YES    Call taken on 6/3/2019 at 8:52 AM by Lorenzo Hernandez

## 2019-06-03 NOTE — TELEPHONE ENCOUNTER
Celina already received call with orders for evaluation. This writer verified with Celina.       Natasha Bashir RN, BSN, PHN

## 2019-06-04 NOTE — TELEPHONE ENCOUNTER
Home Care PHYSICAL THERAPY Mehreen had asked for verbal orders for    PHYSICAL THERAPY to continue for 10 visits over the next 8 weeks    OT assessment    Skilled Nursing assessment    HHA 1 time a week for 6 weeks for bathing    Medication questions and history related questions also    Can someone call her back speak with her and to ok this    Mehreen [phone]

## 2019-06-04 NOTE — TELEPHONE ENCOUNTER
"Returned call to Mehreen, patient's home care RN. Verbal approval given for requested orders below, per [facility] Home Care, Nursing Home or Assisted Living Evaluations and Treatments policy. Previous approval had also been already given to intake at [facility].  Mehreen had questions about medications, needed to just notify provider of drug alert/interactions that \"pop up\" in their system. Needs to send an FYI to provider to update that drug interactions with Amiodarone and Metoprolol and Risperdal. Appears office only prescribed Metoprolol, other drugs come from psychiatrist or cardiology. Mehreen had question about diagnoses in patient chart, wanted to clarify. Writer read off problem list to home care RN. No other questions at this time.    Routing to PCP as an FYI only, home care reporting drug interactions with Amiodarone and Metoprolol and Risperdal. Has to report this alert to PCP office. Does not need call back unless provider makes changes.    Debbi Romano RN      "

## 2019-06-05 ENCOUNTER — TELEPHONE (OUTPATIENT)
Dept: FAMILY MEDICINE | Facility: CLINIC | Age: 69
End: 2019-06-05

## 2019-06-05 NOTE — TELEPHONE ENCOUNTER
Reason for Call:  Other Fax    Detailed comments: [facility] requesting History and Physical to be faxed to fax # [phone] and would denis that faxed as soon as possible today as this is time sensitive.    Phone Number [facility] RN Manager can be reached at: Other phone number:  [phone]    Best Time: anytime    Can we leave a detailed message on this number? YES    Call taken on 6/5/2019 at 1:22 PM by Lorenzo Hernandez

## 2019-06-05 NOTE — TELEPHONE ENCOUNTER
Faxed office notes from 3/4/19 to [facility], atten: Leidy, [phone], right fax confirmed at 4:32 pm today, 6/5/19.  Melanie Lopez MA/  For Teams Spirit and Ludiivna

## 2019-06-06 ENCOUNTER — TELEPHONE (OUTPATIENT)
Dept: FAMILY MEDICINE | Facility: CLINIC | Age: 69
End: 2019-06-06

## 2019-06-06 NOTE — TELEPHONE ENCOUNTER
Reason for Call:  Home Health Care    Rob with [facility]  Homecare called regarding (reason for call):     Orders are needed for this patient.       OT: Pt is looking to get continued occupational therapy visits for two times a week for three weeks and one time a week for two weeks.     Pt Provider: Dr. Rafaela Herr    Phone Number Homecare Nurse can be reached at: [phone]    Can we leave a detailed message on this number? YES    Best Time: anytime    Call taken on 6/6/2019 at 8:44 AM by Lorenzo Hernandez

## 2019-06-06 NOTE — TELEPHONE ENCOUNTER
Notified Myesha doherty for home care orders per [facility] protocol via detailed message.       Natasha Bashir RN, BSN, PHN

## 2019-06-10 ENCOUNTER — MEDICAL CORRESPONDENCE (OUTPATIENT)
Dept: HEALTH INFORMATION MANAGEMENT | Facility: CLINIC | Age: 69
End: 2019-06-10

## 2019-06-13 LAB
CREATININE (EXTERNAL): 1.18 MG/DL (ref 0.7–1.3)
GFR ESTIMATED (EXTERNAL): >60 ML/MIN
GFR ESTIMATED (IF AFRICAN AMERICAN) (EXTERNAL): >60 ML/MIN
INR (EXTERNAL): 1.2 (ref 1–1.2)
POTASSIUM (EXTERNAL): 4.1 MMOL/L (ref 3.5–5.1)

## 2019-06-21 ENCOUNTER — OFFICE VISIT (OUTPATIENT)
Dept: NEUROLOGY | Facility: CLINIC | Age: 69
End: 2019-06-21
Payer: MEDICARE

## 2019-06-21 VITALS
OXYGEN SATURATION: 100 % | HEIGHT: 73 IN | HEART RATE: 73 BPM | WEIGHT: 182 LBS | SYSTOLIC BLOOD PRESSURE: 98 MMHG | BODY MASS INDEX: 24.12 KG/M2 | DIASTOLIC BLOOD PRESSURE: 69 MMHG

## 2019-06-21 DIAGNOSIS — G20.C PARKINSONISM, UNSPECIFIED PARKINSONISM TYPE (H): Primary | ICD-10-CM

## 2019-06-21 PROCEDURE — 99214 OFFICE O/P EST MOD 30 MIN: CPT | Performed by: PSYCHIATRY & NEUROLOGY

## 2019-06-21 ASSESSMENT — UNIFIED PARKINSONS DISEASE RATING SCALE (UPDRS)
RIGIDITY_NECK: MILD: RIGIDITY DETECTED WITHOUT THE ACTIVATION MANEUVER.  FULL RANGE OF MOTION IS EASILY ACHIEVED.
TOETAPPING_RIGHT: NORMAL
POSTURE: 0 NORMAL, NO PROBLEMS
ARISING_CHAIR: NORMAL: ABLE TO ARISE QUICKLY WITHOUT HESITATION.
AMPLITUDE_LLE: NORMAL: NO TREMOR.
RIGIDITY_RLE: NORMAL
TOTAL_SCORE: 15
FREEZING_GAIT: NORMAL
AMPLITUDE_RLE: NORMAL: NO TREMOR.
AMPLITUDE_LUE: NORMAL: NO TREMOR.
PRONATION_SUPINATION_RIGHT: NORMAL
SPEECH: SLIGHT: LOSS OF MODULATION, DICTION OR VOLUME, BUT STILL ALL WORDS EASY TO UNDERSTAND.
PRONATION_SUPINATION_LEFT: SLIGHT: ANY OF THE FOLLOWING: A) THE REGULAR RHYTHM IS BROKEN WITH ONE WITH ONE OR TWO INTERRUPTIONS OR HESITATIONS OF THE MOVEMENT B) SLIGHT SLOWING C) THE AMPLITUDE DECREMENTS NEAR THE END OF THE 10 MOVEMENTS.
AMPLITUDE_LIP_JAW: NORMAL: NO TREMOR.
AXIAL_SCORE: 8
TOTAL_SCORE: 1
HANDMOVEMENTS_RIGHT: NORMAL
FACIAL_EXPRESSION: MODERATE: MASKED FACIES WITH LIPS PARTED SOME OF THE TIME WHEN THE MOUTH IS AT REST.
POSTURAL_STABILITY: NORMAL:  RECOVERS WITH ONE OR TWO STEPS.
GAIT: MILD: INDEPENDENT WALKING BUT WITH SUBSTANTIAL GAIT IMPAIRMENT.
RIGIDITY_LLE: NORMAL
RIGIDITY_RUE: SLIGHT: RIGIDITY ONLY DETECTED WITH ACTIVATION MANEUVER.
TOTAL_SCORE_LEFT: 6
TOETAPPING_LEFT: NORMAL
SPONTANEITY_OF_MOVEMENT: 0: NORMAL.  NO PROBLEMS.
AMPLITUDE_RUE: NORMAL: NO TREMOR.
HANDMOVEMENTS_LEFT: MILD: ANY OF THE FOLLOWING: A) 3 TO 5 INTERRUPTIONS DURING TAPPING B) MILD SLOWING C) THE AMPLITUDE DECREMENTS MIDWAY IN THE 10-MOVEMENT SEQUENCE
PARKINSONS_MEDS: OFF
LEG_AGILITY_RIGHT: NORMAL
CONSTANCY_TREMOR_ATREST: NORMAL: NO TREMOR.
RIGIDITY_LUE: SLIGHT: RIGIDITY ONLY DETECTED WITH ACTIVATION MANEUVER.
FINGER_TAPPING_LEFT: MILD: ANY OF THE FOLLOWING: A) 3 TO 5 INTERRUPTIONS DURING TAPPING B) MILD SLOWING C) THE AMPLITUDE DECREMENTS MIDWAY IN THE 10-MOVEMENT SEQUENCE
FINGER_TAPPING_RIGHT: NORMAL
LEG_AGILITY_LEFT: NORMAL

## 2019-06-21 ASSESSMENT — PAIN SCALES - GENERAL: PAINLEVEL: NO PAIN (0)

## 2019-06-21 ASSESSMENT — MIFFLIN-ST. JEOR: SCORE: 1659.43

## 2019-06-21 NOTE — PROGRESS NOTES
"Movement Disorder Clinic follow up note    Patient: Javier Lovett  Medical Record Number: [id number]  Encounter Date: June 21, 2019  PCP:Rafaela Marcano    CC: Parkinsonism    Impression:  1.  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  Continue Risperdal 2 mg a day.  On this dose he seems at a good balance.  He is not disabled by slow movements.  His Parkinson's scoring has decreased by 50%.  He is psychiatrically stable.  2.  Return as needed if movements worsen.  I do not think that he has underlying idiopathic Parkinson's disease    Return to clinic: PRN    Interval Hx:: MrWilber Lovett returns to clinic today for follow-up of his parkinsonism.  We had felt that he had drug-induced parkinsonism.  The patient had his Risperdal reduced back to 2 mg a day from 4 mg a day.  He says on this he is feeling more mobile.  His movements are faster.  His daughter notices this as well.  His psychiatric situation remains stable.  He has been sleeping well and has not had increasing paranoia.    I repeated his UPDRS part 3 motor score today.  This is a rating scale for Parkinsonism.  This declined from 32 to 15.  This shows about a 50% improvement in his parkinsonism.    Current medications    Movement Disorder-related Medications                                                                                                                                                             Current Outpatient Medications   Medication     amiodarone (PACERONE/CODARONE) 200 MG tablet     apixaban ANTICOAGULANT (ELIQUIS) 5 MG tablet     Cholecalciferol (VITAMIN D PO)     CLINDAMYCIN HCL PO     Multiple Vitamins-Minerals (MULTI VITAMIN/MINERALS PO)     risperiDONE (RISPERDAL) 1 MG tablet     order for DME     No current facility-administered medications for this visit.        Examination:  BP 98/69   Pulse 73   Ht 1.854 m (6' 1\")   Wt 82.6 kg (182 lb)   SpO2 100%   BMI " 24.01 kg/m    General- no distress, no rash, good pulses, no edema  Respiratory-auscultation over the anterior lung fields is clear  Cardiac- regular rate and rhythm    Neurologic  Mental status  Patient is alert, appropriate, speech is fluent and comprehension is intact    Cranial nerve testing  Pupils are equal and reactive to light, visual fields are full to confrontation bilaterally  Extraocular movements are full  Facial sensation is intact, face is symmetric with rest and activation  Palate rises symmetrically, tongue protrudes at midline with normal movements  Sterocleidomastoid and trapezius strength is normal and symmetric    Motor  UPDRS Values 5/10/2019 6/21/2019   Time:  9:43 AM   Medication  Off   R Brain DBS:  None   L Brain DBS:  None   Speech 1 1   Facial Expression 1 3   Rigidity Neck 3 2   Rigidity RUE 2 1   Rigidity LUE 2 1   Rigidity RLE 3 0   Rigidity LLE 3 0   Finger Taps R 0 0   Finger Taps L 2 2   Hand Mvt R 0 0   Hand Mvt L 2 2   Pron-/Supinate R 0 0   Pron-/Supinate L 2 1   Toe Tap R 0 0   Toe Tap L 0 0   Leg Agility R 0 0   Leg Agility L 0 0   Arise From Chair 2 0   Gait 3 2   Gait Freezing 0 0   Postural Stability 3 0   Posture 3 0   Global Spont Mvt 0 0   Postural Tremor RUE 0 0   Postural Tremor LUE 0 0   Kinetic Tremor RUE 0 0   Kinetic Tremor LUE 0 0   Rest Tremor RUE 0 0   Rest Tremor LUE 0 0   Rest Tremor RLE 0 0   Rest Tremor LLE 0 0   Rest Tremor Lip/Jaw 0 0   Rest Tremor Constancy 0 0   Total Right 5 1   Total Left 11 6   Axial Total 16 8   Total 32 15   Sensation  Intact to pin, vibration   Proprioception intact in great toes and fingers    Tendon reflexes  Testing at brachioradialis, biceps, triceps, patella and achilles bilaterally showed normal reflexes, symmetrically, without Babinski or Rios signs    Coordination  Finger nose finger testing: normalRapid alternating movements are normal.  Gait and Station: normal    25 minutes of total time was spent face-to-face with the  patient over 50% of which was counseling..

## 2019-06-21 NOTE — NURSING NOTE
"Javier Lovett's goals for this visit include: return  He requests these members of his care team be copied on today's visit information:     PCP: Rafaela Marcano    Referring Provider:  No referring provider defined for this encounter.    BP 98/69   Pulse 73   Ht 1.854 m (6' 1\")   Wt 82.6 kg (182 lb)   SpO2 100%   BMI 24.01 kg/m      Do you need any medication refills at today's visit? n  "

## 2019-06-21 NOTE — PATIENT INSTRUCTIONS
1.  You are doing well.  Your Parkinsonism has improved 50%.  I think the balance is good now and you should continue the Risperidol 2 mg once a day  2.  Return as needed if movement worsens

## 2019-06-21 NOTE — LETTER
6/21/2019         RE: Javier Lovett  [address] [address]        Dear Colleague,    Thank you for referring your patient, Javier Lovett, to the [facility]. Please see a copy of my visit note below.    Movement Disorder Clinic follow up note    Patient: Javier Lovett  Medical Record Number: [id number]  Encounter Date: June 21, 2019  PCP:Rafaela Marcano    CC: Parkinsonism    Impression:  1.  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  Continue Risperdal 2 mg a day.  On this dose he seems at a good balance.  He is not disabled by slow movements.  His Parkinson's scoring has decreased by 50%.  He is psychiatrically stable.  2.  Return as needed if movements worsen.  I do not think that he has underlying idiopathic Parkinson's disease    Return to clinic: PRN    Interval Hx:: Mr. Javier Lovett returns to clinic today for follow-up of his parkinsonism.  We had felt that he had drug-induced parkinsonism.  The patient had his Risperdal reduced back to 2 mg a day from 4 mg a day.  He says on this he is feeling more mobile.  His movements are faster.  His daughter notices this as well.  His psychiatric situation remains stable.  He has been sleeping well and has not had increasing paranoia.    I repeated his UPDRS part 3 motor score today.  This is a rating scale for Parkinsonism.  This declined from 32 to 15.  This shows about a 50% improvement in his parkinsonism.    Current medications    Movement Disorder-related Medications                                                                                                                                                             Current Outpatient Medications   Medication     amiodarone (PACERONE/CODARONE) 200 MG tablet     apixaban ANTICOAGULANT (ELIQUIS) 5 MG tablet     Cholecalciferol (VITAMIN D PO)     CLINDAMYCIN HCL PO     Multiple Vitamins-Minerals (MULTI  "VITAMIN/MINERALS PO)     risperiDONE (RISPERDAL) 1 MG tablet     order for DME     No current facility-administered medications for this visit.        Examination:  BP 98/69   Pulse 73   Ht 1.854 m (6' 1\")   Wt 82.6 kg (182 lb)   SpO2 100%   BMI 24.01 kg/m     General- no distress, no rash, good pulses, no edema  Respiratory-auscultation over the anterior lung fields is clear  Cardiac- regular rate and rhythm    Neurologic  Mental status  Patient is alert, appropriate, speech is fluent and comprehension is intact    Cranial nerve testing  Pupils are equal and reactive to light, visual fields are full to confrontation bilaterally  Extraocular movements are full  Facial sensation is intact, face is symmetric with rest and activation  Palate rises symmetrically, tongue protrudes at midline with normal movements  Sterocleidomastoid and trapezius strength is normal and symmetric    Motor  UPDRS Values 5/10/2019 6/21/2019   Time:  9:43 AM   Medication  Off   R Brain DBS:  None   L Brain DBS:  None   Speech 1 1   Facial Expression 1 3   Rigidity Neck 3 2   Rigidity RUE 2 1   Rigidity LUE 2 1   Rigidity RLE 3 0   Rigidity LLE 3 0   Finger Taps R 0 0   Finger Taps L 2 2   Hand Mvt R 0 0   Hand Mvt L 2 2   Pron-/Supinate R 0 0   Pron-/Supinate L 2 1   Toe Tap R 0 0   Toe Tap L 0 0   Leg Agility R 0 0   Leg Agility L 0 0   Arise From Chair 2 0   Gait 3 2   Gait Freezing 0 0   Postural Stability 3 0   Posture 3 0   Global Spont Mvt 0 0   Postural Tremor RUE 0 0   Postural Tremor LUE 0 0   Kinetic Tremor RUE 0 0   Kinetic Tremor LUE 0 0   Rest Tremor RUE 0 0   Rest Tremor LUE 0 0   Rest Tremor RLE 0 0   Rest Tremor LLE 0 0   Rest Tremor Lip/Jaw 0 0   Rest Tremor Constancy 0 0   Total Right 5 1   Total Left 11 6   Axial Total 16 8   Total 32 15   Sensation  Intact to pin, vibration   Proprioception intact in great toes and fingers    Tendon reflexes  Testing at brachioradialis, biceps, triceps, patella and achilles " bilaterally showed normal reflexes, symmetrically, without Babinski or Rios signs    Coordination  Finger nose finger testing: normalRapid alternating movements are normal.  Gait and Station: normal    25 minutes of total time was spent face-to-face with the patient over 50% of which was counseling..      Again, thank you for allowing me to participate in the care of your patient.        Sincerely,        Lorenzo Crisostomo MD

## 2019-06-26 ENCOUNTER — MEDICAL CORRESPONDENCE (OUTPATIENT)
Dept: HEALTH INFORMATION MANAGEMENT | Facility: CLINIC | Age: 69
End: 2019-06-26

## 2019-07-01 ENCOUNTER — MEDICAL CORRESPONDENCE (OUTPATIENT)
Dept: HEALTH INFORMATION MANAGEMENT | Facility: CLINIC | Age: 69
End: 2019-07-01

## 2019-07-05 ENCOUNTER — DOCUMENTATION ONLY (OUTPATIENT)
Dept: OTHER | Facility: CLINIC | Age: 69
End: 2019-07-05

## 2019-08-05 ENCOUNTER — MEDICAL CORRESPONDENCE (OUTPATIENT)
Dept: HEALTH INFORMATION MANAGEMENT | Facility: CLINIC | Age: 69
End: 2019-08-05

## 2019-10-07 NOTE — TELEPHONE ENCOUNTER
Reason for Call:  Home Health Care    PT and OT:  eval and treat starting on 5/31/2019  Thinks what was sent might say that but it si not very clear. Needs call back.    Pt Provider: Ana Batres    Phone Number Homecare Nurse can be reached at:   [facility] (HOME CARE) [phone]     Can we leave a detailed message on this number? YES    Best Time: any    Call taken on 5/30/2019 at 9:53 AM by Marva So     No

## 2020-02-24 ENCOUNTER — HEALTH MAINTENANCE LETTER (OUTPATIENT)
Age: 70
End: 2020-02-24

## 2020-12-13 ENCOUNTER — HEALTH MAINTENANCE LETTER (OUTPATIENT)
Age: 70
End: 2020-12-13

## 2021-04-17 ENCOUNTER — HEALTH MAINTENANCE LETTER (OUTPATIENT)
Age: 71
End: 2021-04-17

## 2021-07-02 ENCOUNTER — TRANSFERRED RECORDS (OUTPATIENT)
Dept: HEALTH INFORMATION MANAGEMENT | Facility: CLINIC | Age: 71
End: 2021-07-02

## 2021-09-26 ENCOUNTER — HEALTH MAINTENANCE LETTER (OUTPATIENT)
Age: 71
End: 2021-09-26

## 2022-01-02 ENCOUNTER — OFFICE VISIT (OUTPATIENT)
Dept: URGENT CARE | Facility: URGENT CARE | Age: 72
End: 2022-01-02
Payer: MEDICARE

## 2022-01-02 DIAGNOSIS — S01.311A LACERATION OF EAR CANAL, RIGHT, INITIAL ENCOUNTER: Primary | ICD-10-CM

## 2022-01-03 NOTE — PROGRESS NOTES
Came in with acute bleeding from his R ear after using a Q-tip and scratching his ear earlier today.  He is currently on a blood thinner as well.  No weakness, dizziness, shortness of breath.  He does have a small, bleeding laceration present in the R external ear canal which appears deep.  Recommend further evaluation and management in the ER as this laceration appears deep in the ear canal.  Patient has declined transportation via ambulance and will have family drive her/him.  Understands risks and benefits of ambulance transfer and s/he has declined.  Call 911 if worsening symptoms.  S/he plans to go to [facility].  S/he left in stable condition with AVS in hand.  F/u with PCP after ER visit.     Desiree Aguilar PA-C

## 2022-05-08 ENCOUNTER — HEALTH MAINTENANCE LETTER (OUTPATIENT)
Age: 72
End: 2022-05-08

## 2022-06-01 ENCOUNTER — TELEPHONE (OUTPATIENT)
Dept: FAMILY MEDICINE | Facility: CLINIC | Age: 72
End: 2022-06-01
Payer: MEDICARE

## 2022-06-01 ENCOUNTER — OFFICE VISIT (OUTPATIENT)
Dept: URGENT CARE | Facility: URGENT CARE | Age: 72
End: 2022-06-01
Payer: MEDICARE

## 2022-06-01 VITALS
HEART RATE: 88 BPM | OXYGEN SATURATION: 98 % | SYSTOLIC BLOOD PRESSURE: 141 MMHG | WEIGHT: 192.4 LBS | BODY MASS INDEX: 25.38 KG/M2 | DIASTOLIC BLOOD PRESSURE: 88 MMHG | TEMPERATURE: 98.4 F

## 2022-06-01 DIAGNOSIS — H93.13 TINNITUS, BILATERAL: Primary | ICD-10-CM

## 2022-06-01 DIAGNOSIS — H61.23 BILATERAL IMPACTED CERUMEN: ICD-10-CM

## 2022-06-01 PROCEDURE — 69209 REMOVE IMPACTED EAR WAX UNI: CPT | Performed by: PHYSICIAN ASSISTANT

## 2022-06-01 PROCEDURE — 99213 OFFICE O/P EST LOW 20 MIN: CPT | Mod: 25 | Performed by: PHYSICIAN ASSISTANT

## 2022-06-01 RX ORDER — METOPROLOL TARTRATE 25 MG/1
25 TABLET, FILM COATED ORAL DAILY
COMMUNITY
End: 2022-08-01

## 2022-06-01 ASSESSMENT — ENCOUNTER SYMPTOMS
SINUS PAIN: 0
COUGH: 0
FEVER: 0
CHEST TIGHTNESS: 0
GASTROINTESTINAL NEGATIVE: 1
FATIGUE: 0
SHORTNESS OF BREATH: 0
RESPIRATORY NEGATIVE: 1
CARDIOVASCULAR NEGATIVE: 1
SINUS PRESSURE: 0
PALPITATIONS: 0
CHILLS: 0
SORE THROAT: 0
WHEEZING: 0
RHINORRHEA: 0

## 2022-06-01 NOTE — PROGRESS NOTES
aleisha Herrera is a 71 year old who presents for the following health issues   HPI   Acute Illness  Acute illness concerns:   Onset/Duration: 6months  Symptoms:  Fever: no  Chills/Sweats: no  Headache (location?): no  Sinus Pressure: no  Conjunctivitis:  no  Ear Pain: no pain but describes buzzing sensation but no drainage or changes in his hearing.  No dizziness or recent swimming.  Rhinorrhea: no  Congestion: no  Sore Throat: no  Cough: no  Wheeze: no  Decreased Appetite: no  Nausea: no  Vomiting: no  Diarrhea: no  Dysuria/Freq.: no  Dysuria or Hematuria: no  Fatigue/Achiness: no  Sick/Strep Exposure: no  Therapies tried and outcome: Qtip with minimal relief    Patient Active Problem List   Diagnosis     CARDIOVASCULAR SCREENING; LDL GOAL LESS THAN 100     Hypertrophic cardiomyopathy (H)     MR (mitral regurgitation)     Mood disorder (H)     Atrial fibrillation (H)     Heart murmur     Current Outpatient Medications   Medication     apixaban ANTICOAGULANT (ELIQUIS) 5 MG tablet     Cholecalciferol (VITAMIN D PO)     metoprolol tartrate (LOPRESSOR) 25 MG tablet     Multiple Vitamins-Minerals (MULTI VITAMIN/MINERALS PO)     order for DME     risperiDONE (RISPERDAL) 1 MG tablet     amiodarone (PACERONE/CODARONE) 200 MG tablet     CLINDAMYCIN HCL PO     No current facility-administered medications for this visit.        Allergies   Allergen Reactions     Penicillins      Review of Systems   Constitutional: Negative for chills, fatigue and fever.   HENT: Positive for tinnitus. Negative for congestion, ear discharge, ear pain, hearing loss, rhinorrhea, sinus pressure, sinus pain and sore throat.    Respiratory: Negative.  Negative for cough, chest tightness, shortness of breath and wheezing.    Cardiovascular: Negative.  Negative for chest pain, palpitations and peripheral edema.   Gastrointestinal: Negative.    All other systems reviewed and are negative.           Objective    BP (!) 141/88   Pulse 88   Temp  98.4  F (36.9  C) (Axillary)   Wt 87.3 kg (192 lb 6.4 oz)   SpO2 98%   BMI 25.38 kg/m    Body mass index is 25.38 kg/m .  Physical Exam  Vitals and nursing note reviewed.   Constitutional:       General: He is not in acute distress.     Appearance: Normal appearance. He is normal weight. He is not ill-appearing.   HENT:      Head: Normocephalic and atraumatic.      Right Ear: There is impacted cerumen.      Left Ear: There is impacted cerumen.      Ears:      Comments: TMs are intact without any erythema or bulging bilaterally after ear lavage.  Airway is patent.     Nose: Nose normal.      Mouth/Throat:      Lips: Pink.      Mouth: Mucous membranes are moist.      Pharynx: Oropharynx is clear. Uvula midline. No pharyngeal swelling, oropharyngeal exudate, posterior oropharyngeal erythema or uvula swelling.      Tonsils: No tonsillar exudate or tonsillar abscesses.   Eyes:      General: No scleral icterus.     Conjunctiva/sclera: Conjunctivae normal.      Pupils: Pupils are equal, round, and reactive to light.   Neck:      Thyroid: No thyromegaly.   Cardiovascular:      Rate and Rhythm: Normal rate and regular rhythm.      Pulses: Normal pulses.      Heart sounds: Normal heart sounds, S1 normal and S2 normal. No murmur heard.    No friction rub. No gallop.   Pulmonary:      Effort: Pulmonary effort is normal. No tachypnea, accessory muscle usage, respiratory distress or retractions.      Breath sounds: Normal breath sounds and air entry. No stridor. No decreased breath sounds, wheezing, rhonchi or rales.   Musculoskeletal:      Cervical back: Normal range of motion and neck supple.   Lymphadenopathy:      Cervical: No cervical adenopathy.   Skin:     General: Skin is warm and dry.      Findings: No rash.   Neurological:      Mental Status: He is alert and oriented to person, place, and time.   Psychiatric:         Mood and Affect: Mood normal.         Behavior: Behavior normal.         Thought Content: Thought  content normal.         Judgment: Judgment normal.            Assessment/Plan:  Tinnitus, bilateral:  ?cerumen vs intracranial lesion vs med SE.  Bilateral ears were successfully irrigated in clinic with some relief but continues to have buzzing sound.  No evidence of infection at this time.  Will send to ENT for further evaluation and management. Recommend mineral oil as needed for earwax buildup.  Avoid foreign body insertion.  Tylenol/ibuprofen pain pain. Recheck in clinic if symptoms worsen or if symptoms do not improve.   -     Adult ENT  Referral    Bilateral impacted cerumen  -     [facility] REMOVAL IMPACTED CERUMEN IRRIGATION/LVG UNILAT        Desiree See GOOD Aguilar

## 2022-06-01 NOTE — TELEPHONE ENCOUNTER
Patient reports that he is experiencing a steady noise noise. It's like static(hum or buzzing) from like an old TV. This has been going on from about 6 months. No ear injuries or pain.  He does clean with Q-tip.  It has not been addresses before. He does not feel that he feels any more worse than usual. Unsure if his blood pressure  Is elevated now.  Does have HTN. Overall he is feeling well besides this noise.     Denies: HA, dizziness, weakness, SOB, trouble breathing, chest pain.     Nursing advice:  Patient is to be seen in clinic within a week. In looking at the schedules there are no appointments available within a week.  Patient reports that he is uncomfortable with GPS and is uncomfortable navigating to other locations.  Since there are no appointments available for him he is to use urgent care and can come tonight to have this assessed. I had to reiterate the plan more than once but by the end he was able to understand the plan. Patient verbalized good understanding, agrees with plan and needs no further support.  Thank you. Fiona Almanza R.N.

## 2022-08-01 ENCOUNTER — OFFICE VISIT (OUTPATIENT)
Dept: FAMILY MEDICINE | Facility: CLINIC | Age: 72
End: 2022-08-01
Payer: MEDICARE

## 2022-08-01 VITALS
SYSTOLIC BLOOD PRESSURE: 128 MMHG | RESPIRATION RATE: 18 BRPM | HEART RATE: 92 BPM | BODY MASS INDEX: 24.92 KG/M2 | HEIGHT: 73 IN | WEIGHT: 188 LBS | OXYGEN SATURATION: 97 % | DIASTOLIC BLOOD PRESSURE: 76 MMHG | TEMPERATURE: 97.6 F

## 2022-08-01 DIAGNOSIS — R97.20 ELEVATED PROSTATE SPECIFIC ANTIGEN (PSA): ICD-10-CM

## 2022-08-01 DIAGNOSIS — Z12.11 SCREEN FOR COLON CANCER: ICD-10-CM

## 2022-08-01 DIAGNOSIS — Z00.00 ENCOUNTER FOR MEDICARE ANNUAL WELLNESS EXAM: Primary | ICD-10-CM

## 2022-08-01 DIAGNOSIS — Z12.5 SCREENING FOR PROSTATE CANCER: ICD-10-CM

## 2022-08-01 DIAGNOSIS — I42.2 HYPERTROPHIC CARDIOMYOPATHY (H): ICD-10-CM

## 2022-08-01 DIAGNOSIS — I48.92 ATRIAL FLUTTER, UNSPECIFIED TYPE (H): ICD-10-CM

## 2022-08-01 DIAGNOSIS — Z13.220 SCREENING FOR HYPERLIPIDEMIA: ICD-10-CM

## 2022-08-01 DIAGNOSIS — H90.6 MIXED CONDUCTIVE AND SENSORINEURAL HEARING LOSS OF BOTH EARS: ICD-10-CM

## 2022-08-01 DIAGNOSIS — F39 MOOD DISORDER (H): ICD-10-CM

## 2022-08-01 LAB
CHOLEST SERPL-MCNC: 172 MG/DL
FASTING STATUS PATIENT QL REPORTED: NO
HDLC SERPL-MCNC: 58 MG/DL
LDLC SERPL CALC-MCNC: 105 MG/DL
NONHDLC SERPL-MCNC: 114 MG/DL
PSA SERPL-MCNC: 5.92 UG/L (ref 0–4)
TRIGL SERPL-MCNC: 45 MG/DL

## 2022-08-01 PROCEDURE — 90677 PCV20 VACCINE IM: CPT | Performed by: PHYSICIAN ASSISTANT

## 2022-08-01 PROCEDURE — G0009 ADMIN PNEUMOCOCCAL VACCINE: HCPCS | Mod: 59 | Performed by: PHYSICIAN ASSISTANT

## 2022-08-01 PROCEDURE — 36415 COLL VENOUS BLD VENIPUNCTURE: CPT | Performed by: PHYSICIAN ASSISTANT

## 2022-08-01 PROCEDURE — G0438 PPPS, INITIAL VISIT: HCPCS | Performed by: PHYSICIAN ASSISTANT

## 2022-08-01 PROCEDURE — 90750 HZV VACC RECOMBINANT IM: CPT | Performed by: PHYSICIAN ASSISTANT

## 2022-08-01 PROCEDURE — 90471 IMMUNIZATION ADMIN: CPT | Performed by: PHYSICIAN ASSISTANT

## 2022-08-01 PROCEDURE — G0103 PSA SCREENING: HCPCS | Performed by: PHYSICIAN ASSISTANT

## 2022-08-01 PROCEDURE — 80061 LIPID PANEL: CPT | Performed by: PHYSICIAN ASSISTANT

## 2022-08-01 RX ORDER — RISPERIDONE 0.5 MG/1
TABLET ORAL
COMMUNITY
Start: 2022-06-04

## 2022-08-01 RX ORDER — METOPROLOL SUCCINATE 25 MG/1
37 TABLET, EXTENDED RELEASE ORAL DAILY
COMMUNITY
Start: 2022-07-23

## 2022-08-01 ASSESSMENT — ENCOUNTER SYMPTOMS
PARESTHESIAS: 1
SHORTNESS OF BREATH: 0
SORE THROAT: 0
ABDOMINAL PAIN: 0
WEAKNESS: 0
DIZZINESS: 1
PALPITATIONS: 0
CHILLS: 0
COUGH: 0
NAUSEA: 0
CONSTIPATION: 0
FEVER: 0
HEARTBURN: 0
DIARRHEA: 0
EYE PAIN: 0
DYSURIA: 0
MYALGIAS: 0
HEADACHES: 0
ARTHRALGIAS: 0
NERVOUS/ANXIOUS: 1
HEMATURIA: 0
JOINT SWELLING: 0
HEMATOCHEZIA: 0
FREQUENCY: 1

## 2022-08-01 ASSESSMENT — ACTIVITIES OF DAILY LIVING (ADL): CURRENT_FUNCTION: NO ASSISTANCE NEEDED

## 2022-08-01 ASSESSMENT — PAIN SCALES - GENERAL: PAINLEVEL: NO PAIN (0)

## 2022-08-01 NOTE — PATIENT INSTRUCTIONS
Patient Education   Personalized Prevention Plan  You are due for the preventive services outlined below.  Your care team is available to assist you in scheduling these services.  If you have already completed any of these items, please share that information with your care team to update in your medical record.  Health Maintenance Due   Topic Date Due    Pneumococcal Vaccine (1 - PCV) 09/03/2017    AORTIC ANEURYSM SCREENING (SYSTEM ASSIGNED)  Never done    Comprehensive Metabolic Panel  06/21/2018    Zoster (Shingles) Vaccine (2 of 2) 04/29/2019    Cholesterol Lab  02/24/2020    Colorectal Cancer Screening  11/01/2021       Understanding USDA MyPlate  The USDA has guidelines to help you make healthy food choices. These are called MyPlate. MyPlate shows the food groups that make up healthy meals using the image of a place setting. Before you eat, think about the healthiest choices for what to put on your plate or in your cup or bowl. To learn more about building a healthy plate, visit www.iodinemyplate.gov.    The food groups  Fruits. Any fruit or 100% fruit juice counts as part of the Fruit Group. Fruits may be fresh, canned, frozen, or dried, and may be whole, cut-up, or pureed. Make 1/2 of your plate fruits and vegetables.  Vegetables. Any vegetable or 100% vegetable juice counts as a member of the Vegetable Group. Vegetables may be fresh, frozen, canned, or dried. They can be served raw or cooked and may be whole, cut-up, or mashed. Make 1/2 of your plate fruits and vegetables.  Grains. All foods made from grains are part of the Grains Group. These include wheat, rice, oats, cornmeal, and barley. Grains are often used to make foods such as bread, pasta, oatmeal, cereal, tortillas, and grits. Grains should be no more than 1/4 of your plate. At least half of your grains should be whole grains.  Protein. This group includes meat, poultry, seafood, beans and peas, eggs, processed soy products (such as tofu), nuts  (including nut butters), and seeds. Make protein choices no more than 1/4 of your plate. Meat and poultry choices should be lean or low fat.  Dairy. The Dairy Group includes all fluid milk products and foods made from milk that contain calcium, such as yogurt and cheese. (Foods that have little calcium, such as cream, butter, and cream cheese, are not part of this group.) Most dairy choices should be low-fat or fat-free.  Oils. Oils aren't a food group, but they do contain essential nutrients. However it's important to watch your intake of oils. These are fats that are liquid at room temperature. They include canola, corn, olive, soybean, vegetable, and sunflower oil. Foods that are mainly oil include mayonnaise, certain salad dressings, and soft margarines. You likely already get your daily oil allowance from the foods you eat.  Things to limit  Eating healthy also means limiting these things in your diet:     Salt (sodium). Many processed foods have a lot of sodium. To keep sodium intake down, eat fresh vegetables, meats, poultry, and seafood when possible. Purchase low-sodium, reduced-sodium, or no-salt-added food products at the store. And don't add salt to your meals at home. Instead, season them with herbs and spices such as dill, oregano, cumin, and paprika. Or try adding flavor with lemon or lime zest and juice.  Saturated fat. Saturated fats are most often found in animal products such as beef, pork, and chicken. They are often solid at room temperature, such as butter. To reduce your saturated fat intake, choose leaner cuts of meat and poultry. And try healthier cooking methods such as grilling, broiling, roasting, or baking. For a simple lower-fat swap, use plain nonfat yogurt instead of mayonnaise when making potato salad or macaroni salad.  Added sugars. These are sugars added to foods. They are in foods such as ice cream, candy, soda, fruit drinks, sports drinks, energy drinks, cookies, pastries, jams,  and syrups. Cut down on added sugars by sharing sweet treats with a family member or friend. You can also choose fruit for dessert, and drink water or other unsweetened beverages.     [employer] last reviewed this educational content on 6/1/2020 2000-2021 [employer]. All rights reserved. This information is not intended as a substitute for professional medical care. Always follow your healthcare professional's instructions.          Signs of Hearing Loss      Hearing much better with one ear can be a sign of hearing loss.   Hearing loss is a problem shared by many people. In fact, it is one of the most common health problems, particularly as people age. Most people age 65 and older have some hearing loss. By age 80, almost everyone does. Hearing loss often occurs slowly over the years. So you may not realize your hearing has gotten worse.  Have your hearing checked  Call your healthcare provider if you:  Have to strain to hear normal conversation  Have to watch other people s faces very carefully to follow what they re saying  Need to ask people to repeat what they ve said  Often misunderstand what people are saying  Turn the volume of the television or radio up so high that others complain  Feel that people are mumbling when they re talking to you  Find that the effort to hear leaves you feeling tired and irritated  Notice, when using the phone, that you hear better with one ear than the other  [employer] last reviewed this educational content on 1/1/2020 2000-2021 [employer]. All rights reserved. This information is not intended as a substitute for professional medical care. Always follow your healthcare professional's instructions.          Urinary Incontinence (Male)    Urinary incontinence means not being able to control the release of urine from the bladder.   Causes  Common causes of urinary incontinence in men include:  Infection  Certain medicines  Aging  Poor pelvic muscle  tone  Bladder spasms  Obesity  Trouble urinating and fully emptying the bladder (urinary retention)  Other things that can cause incontinence are:   Nervous system diseases  Diabetes  Sleep apnea  Urinary tract infections  Prostate surgery  Pelvic injury  Constipation and smoking have also been identified as risk factors.   Symptoms  Urge incontinence (overactive bladder). This is a sudden urge to urinate. It occurs even though there may not be much urine in the bladder. The need to urinate often during the night is common. It's due to bladder spasms.  Stress incontinence. This is urine leakage that you can't control. It can occur with sneezing, coughing, and other actions that put stress on the bladder.    Treatment  Treatment depends on what is causing the condition. Bladder infections are treated with antibiotics. Urinary retention is treated with a bladder catheter.   Home care  Follow these guidelines when caring for yourself at home:  Don't have any foods and drinks that may irritate the bladder. This includes:  Chocolate  Alcohol  Caffeine  Carbonated drinks  Acidic fruits and juices  Limit fluids to 6 to 8 cups a day.  Lose weight if you are overweight. This will reduce your symptoms.  If advised, do regular pelvic muscle-strengthening exercises such as Kegel exercises.  If needed, wear absorbent pads to catch urine. Change the pads often. This is for good hygiene and to prevent skin and bladder infections.  Bathe daily for good hygiene.  If an antibiotic was prescribed to treat a bladder infection, take it until it's finished. Keep taking it even if you are feeling better. This is to make sure your infection has cleared.  If a catheter was left in place, keep bacteria from getting into the collection bag. Don't disconnect the catheter from the collection bag.  Use a leg band to secure the catheter drainage tube, so it does not pull on the catheter. Drain the collection bag when it becomes full. To do this,  use the drain spout at the bottom of the bag. Don't disconnect the bag from the catheter.  Don't pull on or try to remove a catheter. The catheter must be removed by a healthcare provider.  If you smoke, stop. Ask your provider for help if you can't do this on your own.  Follow-up care  Follow up with your healthcare provider, or as advised.  When to get medical advice  Call your healthcare provider right away if any of these occur:  Fever over 100.4 F (38 C), or as directed by your provider  Bladder pain or fullness  Belly swelling, nausea, or vomiting  Back pain  Weakness, dizziness, or fainting  If a catheter was left in place, return if:  The catheter falls out  The catheter stops draining for 6 hours  Your urine gets cloudy or smells bad  [employer] last reviewed this educational content on 1/1/2020 2000-2021 [employer]. All rights reserved. This information is not intended as a substitute for professional medical care. Always follow your healthcare professional's instructions.

## 2022-08-01 NOTE — LETTER
August 16, 2022      Javier Lovett  [address] [address]             Dear ,     Your PSA is elevated, this may be gerber to benign hyperplasia (enlarged prostate). However,  please follow up with urology to make sure its nothing serious.     Cholesterol is within normal limits.     Sincerely,   Aubree Toussaint PA-C       Resulted Orders   Lipid Profile (Chol, Trig, HDL, LDL calc)   Result Value Ref Range    Cholesterol 172 <200 mg/dL    Triglycerides 45 <150 mg/dL    Direct Measure HDL 58 >=40 mg/dL    LDL Cholesterol Calculated 105 (H) <=100 mg/dL    Non HDL Cholesterol 114 <130 mg/dL    Patient Fasting > 8hrs? No     Narrative    Cholesterol  Desirable:  <200 mg/dL    Triglycerides  Normal:  Less than 150 mg/dL  Borderline High:  150-199 mg/dL  High:  200-499 mg/dL  Very High:  Greater than or equal to 500 mg/dL    Direct Measure HDL  Female:  Greater than or equal to 50 mg/dL   Male:  Greater than or equal to 40 mg/dL    LDL Cholesterol  Desirable:  <100mg/dL  Above Desirable:  100-129 mg/dL   Borderline High:  130-159 mg/dL   High:  160-189 mg/dL   Very High:  >= 190 mg/dL    Non HDL Cholesterol  Desirable:  130 mg/dL  Above Desirable:  130-159 mg/dL  Borderline High:  160-189 mg/dL  High:  190-219 mg/dL  Very High:  Greater than or equal to 220 mg/dL   PSA, screen   Result Value Ref Range    Prostate Specific Antigen Screen 5.92 (H) 0.00 - 4.00 ug/L

## 2022-08-01 NOTE — PROGRESS NOTES
"SUBJECTIVE:   Javier Lovett is a 71 year old male who presents for Preventive Visit.      Patient has been advised of split billing requirements and indicates understanding: Yes  Are you in the first 12 months of your Medicare coverage?  No    Healthy Habits:     In general, how would you rate your overall health?  Good    Frequency of exercise:  2-3 days/week    Duration of exercise:  15-30 minutes    Do you usually eat at least 4 servings of fruit and vegetables a day, include whole grains    & fiber and avoid regularly eating high fat or \"junk\" foods?  No    Taking medications regularly:  Yes    Medication side effects:  Lightheadedness    Ability to successfully perform activities of daily living:  No assistance needed    Home Safety:  No safety concerns identified    Hearing Impairment:  Difficulty following a conversation in a noisy restaurant or crowded room, feel that people are mumbling or not speaking clearly, need to ask people to speak up or repeat themselves and difficulty understanding soft or whispered speech    In the past 6 months, have you been bothered by leaking of urine? Yes    In general, how would you rate your overall mental or emotional health?  Good      PHQ-2 Total Score: 1    Additional concerns today:  No    Do you feel safe in your environment? Yes    Have you ever done Advance Care Planning? (For example, a Health Directive, POLST, or a discussion with a medical provider or your loved ones about your wishes): No, advance care planning information given to patient to review.  Patient declined advance care planning discussion at this time.       Fall risk  Fallen 2 or more times in the past year?: No  Any fall with injury in the past year?: No  click delete button to remove this line now  Cognitive Screening   1) Repeat 3 items (Leader, Season, Table)    2) Clock draw: NORMAL  3) 3 item recall: Recalls 3 objects  Results: 3 items recalled: COGNITIVE IMPAIRMENT LESS LIKELY    Mini-CogTM " Copyright JOSEPH Thomson. Licensed by the author for use in [facility]; reprinted with permission (cleopatra@[facility]). All rights reserved.      Do you have sleep apnea, excessive snoring or daytime drowsiness?: no    Depression Followup    How are you doing with your depression since your last visit? Improved     Are you having other symptoms that might be associated with depression? No    Have you had a significant life event?  No     Are you feeling anxious or having panic attacks?   No    Do you have any concerns with your use of alcohol or other drugs? No  Patient sees psych  Social History     Tobacco Use     Smoking status: Never Smoker     Smokeless tobacco: Never Used   Substance Use Topics     Alcohol use: Yes     Alcohol/week: 0.0 standard drinks     Comment: social     Drug use: No     No flowsheet data found.  JAMES-7 SCORE 5/27/2015 8/10/2015 9/21/2016   Total Score 10 7 -   Total Score - - 4     No flowsheet data found.    Suicide Assessment Five-step Evaluation and Treatment (SAFE-T)          Reviewed and updated as needed this visit by clinical staff   Tobacco  Allergies  Meds  Problems  Med Hx  Surg Hx  Fam Hx  Soc   Hx          Reviewed and updated as needed this visit by Provider    Allergies  Meds  Problems              Social History     Tobacco Use     Smoking status: Never Smoker     Smokeless tobacco: Never Used   Substance Use Topics     Alcohol use: Yes     Alcohol/week: 0.0 standard drinks     Comment: social     If you drink alcohol do you typically have >3 drinks per day or >7 drinks per week? No    Alcohol Use 8/1/2022   Prescreen: >3 drinks/day or >7 drinks/week? Not Applicable   Prescreen: >3 drinks/day or >7 drinks/week? -               Current providers sharing in care for this patient include:   Patient Care Team:  Rafaela Marcano MD as PCP - General (Family Practice)    The following health maintenance items are reviewed in Epic and correct as of  today:  Health Maintenance Due   Topic Date Due     AORTIC ANEURYSM SCREENING (SYSTEM ASSIGNED)  Never done     CMP  06/21/2018     COLORECTAL CANCER SCREENING  11/01/2021     Patient Active Problem List   Diagnosis     CARDIOVASCULAR SCREENING; LDL GOAL LESS THAN 100     Hypertrophic cardiomyopathy (H)     MR (mitral regurgitation)     Mood disorder (H)     Atrial fibrillation (H)     Heart murmur     History reviewed. No pertinent surgical history.    Social History     Tobacco Use     Smoking status: Never Smoker     Smokeless tobacco: Never Used   Substance Use Topics     Alcohol use: Yes     Alcohol/week: 0.0 standard drinks     Comment: social     Family History   Problem Relation Age of Onset     Glaucoma No family hx of      Macular Degeneration No family hx of          Current Outpatient Medications   Medication Sig Dispense Refill     apixaban ANTICOAGULANT (ELIQUIS) 5 MG tablet Take 5 mg by mouth       cholecalciferol 25 MCG (1000 UT) TABS Take 25 mcg by mouth       COVID-19 mRNA vacc, PFIZER, 30 MCG/0.3ML injection        metoprolol succinate ER (TOPROL XL) 25 MG 24 hr tablet        Multiple Vitamins-Minerals (MULTI VITAMIN/MINERALS PO) Take  by mouth daily.       risperiDONE (RISPERDAL) 0.5 MG tablet        CLINDAMYCIN HCL PO Prior to dental work (Patient not taking: No sig reported)       order for DME Equipment being ordered: BP monitor machine (Patient not taking: Reported on 8/1/2022) 1 Device 0     Allergies   Allergen Reactions     Penicillins      Pneumonia Vaccine:PVC20        Review of Systems   Constitutional: Negative for chills and fever.   HENT: Positive for congestion and hearing loss. Negative for ear pain and sore throat.    Eyes: Negative for pain and visual disturbance.   Respiratory: Negative for cough and shortness of breath.    Cardiovascular: Negative for chest pain, palpitations and peripheral edema.   Gastrointestinal: Negative for abdominal pain, constipation, diarrhea,  "heartburn, hematochezia and nausea.   Genitourinary: Positive for frequency and urgency. Negative for dysuria, genital sores, hematuria, impotence and penile discharge.   Musculoskeletal: Negative for arthralgias, joint swelling and myalgias.   Skin: Negative for rash.   Neurological: Positive for dizziness and paresthesias. Negative for weakness and headaches.   Psychiatric/Behavioral: Positive for mood changes. The patient is nervous/anxious.      Constitutional, HEENT, cardiovascular, pulmonary, gi and gu systems are negative, except as otherwise noted.    OBJECTIVE:   /76 (BP Location: Left arm, Patient Position: Sitting, Cuff Size: Adult Regular)   Pulse 92   Temp 97.6  F (36.4  C) (Tympanic)   Resp 18   Ht 1.854 m (6' 1\")   Wt 85.3 kg (188 lb)   SpO2 97%   BMI 24.80 kg/m   Estimated body mass index is 24.8 kg/m  as calculated from the following:    Height as of this encounter: 1.854 m (6' 1\").    Weight as of this encounter: 85.3 kg (188 lb).  Physical Exam  GENERAL: healthy, alert and no distress  EYES: Eyes grossly normal to inspection, PERRL and conjunctivae and sclerae normal  HENT: ear canals and TM's normal, nose and mouth without ulcers or lesions  NECK: no adenopathy, no asymmetry, masses, or scars and thyroid normal to palpation  RESP: lungs clear to auscultation - no rales, rhonchi or wheezes  CV: regular rate and rhythm, normal S1 S2, no S3 or S4, no murmur, click or rub, no peripheral edema and peripheral pulses strong  ABDOMEN: soft, nontender, no hepatosplenomegaly, no masses and bowel sounds normal  MS: no gross musculoskeletal defects noted, no edema  SKIN: no suspicious lesions or rashes  NEURO: Normal strength and tone, mentation intact and speech normal  PSYCH: mentation appears normal, affect normal/bright    Diagnostic Test Results:  Labs reviewed in Epic    ASSESSMENT / PLAN:   Javier was seen today for physical.    Diagnoses and all orders for this visit:    Encounter for " "Medicare annual wellness exam    Screening for hyperlipidemia  -     Lipid Profile (Chol, Trig, HDL, LDL calc); Future  -     Lipid Profile (Chol, Trig, HDL, LDL calc)    Screen for colon cancer  -     Colonscopy Screening  Referral; Future    Screening for prostate cancer  -     PSA, screen; Future  -     PSA, screen    Hypertrophic cardiomyopathy (H)    Mood disorder (H)    Atrial flutter, unspecified type (H)    Mixed conductive and sensorineural hearing loss of both ears  -     Adult Audiology  Referral; Future    Other orders  -     REVIEW OF HEALTH MAINTENANCE PROTOCOL ORDERS  -     Pneumococcal 20 Valent Conjugate (PCV20)  -     ZOSTER VACCINE RECOMBINANT ADJUVANTED (SHINGRIX)      Continue care with psychiatry  Continue care with cardiology   Schedule colonoscopy   Follow up with audiology    Patient has been advised of split billing requirements and indicates understanding: Yes    COUNSELING:  Reviewed preventive health counseling, as reflected in patient instructions       Regular exercise       Healthy diet/nutrition       Immunizations    Vaccinated for: Pneumococcal and Zoster             Colon cancer screening       Prostate cancer screening    Estimated body mass index is 24.8 kg/m  as calculated from the following:    Height as of this encounter: 1.854 m (6' 1\").    Weight as of this encounter: 85.3 kg (188 lb).        He reports that he has never smoked. He has never used smokeless tobacco.      Appropriate preventive services were discussed with this patient, including applicable screening as appropriate for cardiovascular disease, diabetes, osteopenia/osteoporosis, and glaucoma.  As appropriate for age/gender, discussed screening for colorectal cancer, prostate cancer, breast cancer, and cervical cancer. Checklist reviewing preventive services available has been given to the patient.    Reviewed patients plan of care and provided an AVS. The Basic Care Plan (routine screening as " documented in Health Maintenance) for Javier meets the Care Plan requirement. This Care Plan has been established and reviewed with the Patient.    Counseling Resources:  ATP IV Guidelines  Pooled Cohorts Equation Calculator  Breast Cancer Risk Calculator  Breast Cancer: Medication to Reduce Risk  FRAX Risk Assessment  ICSI Preventive Guidelines  Dietary Guidelines for Americans, 2010  USDA's MyPlate  ASA Prophylaxis  Lung CA Screening    Aubree Toussaint PA-C  [facility]    Identified Health Risks:    The patient was counseled and encouraged to consider modifying their diet and eating habits. He was provided with information on recommended healthy diet options.  The patient was provided with written information regarding signs of hearing loss.  Information on urinary incontinence and treatment options given to patient.

## 2022-08-01 NOTE — NURSING NOTE
Prior to immunization administration, verified patients identity using patient s name and date of birth. Please see Immunization Activity for additional information.     Screening Questionnaire for Adult Immunization    Are you sick today?   No   Do you have allergies to medications, food, a vaccine component or latex?   No   Have you ever had a serious reaction after receiving a vaccination?   No   Do you have a long-term health problem with heart, lung, kidney, or metabolic disease (e.g., diabetes), asthma, a blood disorder, no spleen, complement component deficiency, a cochlear implant, or a spinal fluid leak?  Are you on long-term aspirin therapy?   Yes   Do you have cancer, leukemia, HIV/AIDS, or any other immune system problem?   No   Do you have a parent, brother, or sister with an immune system problem?   No   In the past 3 months, have you taken medications that affect  your immune system, such as prednisone, other steroids, or anticancer drugs; drugs for the treatment of rheumatoid arthritis, Crohn s disease, or psoriasis; or have you had radiation treatments?   No   Have you had a seizure, or a brain or other nervous system problem?   No   During the past year, have you received a transfusion of blood or blood    products, or been given immune (gamma) globulin or antiviral drug?   No   For women: Are you pregnant or is there a chance you could become       pregnant during the next month?   No   Have you received any vaccinations in the past 4 weeks?   No     Immunization questionnaire was positive for at least one answer.  Notified *Norbert        Per orders of Aubree Toussaint PA-C, injection of Shingrix and Prevnar 20 given by Diane L. Schoenherr, RN. Patient instructed to remain in clinic for 15 minutes afterwards, and to report any adverse reaction to me immediately.       Screening performed by Diane L. Schoenherr, RN on 8/1/2022 at 10:50 AM.

## 2022-08-02 ENCOUNTER — TELEPHONE (OUTPATIENT)
Dept: GASTROENTEROLOGY | Facility: CLINIC | Age: 72
End: 2022-08-02

## 2022-08-02 NOTE — TELEPHONE ENCOUNTER
Screening Questions  BlueKIND OF PREP RedLOCATION [review exclusion criteria] GreenSEDATION TYPE      1.  NO Are you active on mychart?       2.  Aubree Toussaint PA-C Ordering/Referring Provider:      3. MEDICARE/[employer]  What insurance is in the chart?    4.  Y Do you have a legal guardian or medical Power of ?              Are you able to give consent for your medical care?                (Sedation review/consideration needed)      5.   24.80  BMI  [BMI OVER 40-EXTENDED PREP]  If greater than 40 review exclusion criteria [PAC APPT IF @ [facility]]       6.   NO Have you had a positive covid test in the last 90 days?      7.   Respiratory Screening :  [If yes to any of the following HOSPITAL setting only]                     N Do you use daily home oxygen?   N Do you have mod to severe Obstructive Sleep Apnea?  [OKAY @ [facility] SH PH RI]                  N Do you have Pulmonary Hypertension?                   N Do you have UNCONTROLLED asthma?         8.   N Have you had a heart or lung transplant?       9.   N Are you currently on dialysis?   [ If yes, G-PREP & HOSPITAL setting only]      10.   N  Do you have chronic kidney disease?  [ If yes, G-PREP ]     11.  N Have you had a stroke or Transient ischemic attack (TIA - aka  mini stroke ) within 6 months?   (If yes, please review exclusion criteria)     12.   N In the past 6 months, have you had any heart related issues including cardiomyopathy or heart attack?            If yes, did it require cardiac stenting or other implantable device?       13.   YES - PACEMAKER MAY 2022 Do you have any implantable devices in your body (pacemaker, defib, LVAD)?  (If yes, please review exclusion criteria)     14.   N Do you take nitroglycerin?            N If yes, how often? n  (if yes, HOSPITAL setting ONLY)     15.   YES Are you currently taking any blood thinners?           [IF YES, INFORM PATIENT TO FOLLOW UP W/ ORDERING PROVIDER FOR BRIDGING  INSTRUCTIONS]      16.    N  Do you have a diagnosis of diabetes?  [ If yes, G-PREP ]     17.   [FEMALES] N Are you currently pregnant?    N If yes, how many weeks?      18.    N  Are you taking any prescription pain medications on a routine schedule?    [ If yes, EXTENDED PREP.] [If yes, MAC]    22.  Do you take the medication Phentermine?     Yes-> Hold for 7 days before procedure.  Please consult your prescribing provider if you have questions about holding this medication.     No-> Continue to next question.     19.   N Do you have any chemical dependencies such as alcohol, street drugs, or methadone?   [If yes, MAC]     20.   N Do you have any history of post-traumatic stress syndrome, severe anxiety or history of psychosis?   [If yes, MAC]     21.   Y Do you transfer independently?       22.   Y  On a regular basis do you go 3-5 days between bowel movements?  [ If yes, EXTENDED PREP.]     23.    Preferred LOCAL Pharmacy for Pre Prescription     [id number] - [place], [place] - [address] AT [facility] [address]          Scheduling Details         Javier : Caller   (Please ask for phone number if not scheduled by patient)    Lower Endoscopy [Colonoscopy] : Type of Procedure Scheduled   EXTENDED Which Colonoscopy Prep was Sent?      LI Surgeon    09/23 Date of Procedure   [facility] Location    MOD Sedation Type     Conscious Sedation- Needs  for 6 hours after the procedure  MAC/General-Needs  for 24 hours after procedure     N Pre-op Required at [facility] [place], [place] and OR for MAC sedation   (advise patient they will need a pre-op prior to procedure -)       Y Informed patient they will need an adult    Cannot take any type of public or medical transportation alone     HOME Pre-Procedure Covid test to be completed at [facility] or Externally  [[facility] PCR Testing Required]     Y  Confirmed Nurse will call to complete assessment     Additional comments:         Can't do 09/15 OR NO TUES  WOULD LIKE A MAP  INFO ON DISCHARGE W/ RIDE

## 2022-08-18 ENCOUNTER — TELEPHONE (OUTPATIENT)
Dept: UROLOGY | Facility: CLINIC | Age: 72
End: 2022-08-18

## 2022-08-18 ENCOUNTER — TELEPHONE (OUTPATIENT)
Dept: FAMILY MEDICINE | Facility: CLINIC | Age: 72
End: 2022-08-18

## 2022-08-18 NOTE — TELEPHONE ENCOUNTER
Chart reviewed. Message sent to scheduling team with request to contact patient to assist in scheduling an in person or video with Dr. Naranjo on 9/1/22 at 9:15am.    Yesenia Camilo RN, BSN

## 2022-08-18 NOTE — TELEPHONE ENCOUNTER
[facility] Call Center    Phone Message    May a detailed message be left on voicemail: yes     Reason for Call: Patient called to schedule off of a Urology referral. He is suppose to be seen for Elevated Prostate but under the protocols it would be a video visit only. Patient states that he doesn't know how to work video and his adult children work and have kids so they are unable to help. Patient wants to know if he could get a phone call or come into clinic for this appointment. Patient would prefer [facility]. Please contact patient to let him know if he has these options. Thank you    Action Taken: Other: Uro    Travel Screening: Not Applicable

## 2022-08-18 NOTE — TELEPHONE ENCOUNTER
Left voicemail for patient to schedule this appointment.     Becca benavides Procedure   Dermatology, General and Plastic Surgery, Urology Specialties   [facility] [place] [address]

## 2022-08-18 NOTE — TELEPHONE ENCOUNTER
Pt calling. He has not received his lab results from 8/1/22.  Advised they were sent on 8/16/22 so he should get in a few days. I verbally read provider result note to pr.  He has been contacted by urology but had not got results yet so did not schedule. He will make appointment now.  Scheduling number given to pt.  Celina HERNANDEZN, RN

## 2022-08-23 NOTE — TELEPHONE ENCOUNTER
Appointment made by  MERLINE NI 8/18/2022 9:59 AM  Closing encounter at this time.     Analisa Jalloh LPN on 8/23/2022 at 11:49 AM

## 2022-08-29 ENCOUNTER — TELEPHONE (OUTPATIENT)
Dept: FAMILY MEDICINE | Facility: CLINIC | Age: 72
End: 2022-08-29

## 2022-08-29 NOTE — TELEPHONE ENCOUNTER
Pt called back and relayed provider's note.    Pt said he will bring in bottle to his urology visit to determine what they advise.      Char Wolf RN  [facility]

## 2022-08-29 NOTE — TELEPHONE ENCOUNTER
This writer attempted to contact patient on 08/29/22      Reason for call message bellow  and left message.      If patient calls back:   Registered Nurse called. Refer call to Cassy Herrera RN Team.      Char Wolf RN  [facility]

## 2022-08-29 NOTE — TELEPHONE ENCOUNTER
Over the counter herbal supplements are not FDA approved, so its hard to comment on their benefit unless their are plain vitamins.  It's most likely will not cause harm to his prostate, but I am not aware of the potential benefits.  Patient may try to take it and follow up with urology on this again in October    Aubree Toussaint PA-C

## 2022-08-29 NOTE — TELEPHONE ENCOUNTER
Patient saw Aubree Toussaint PA-C on 8/1. PSA was elevated, patient has appointment with urology on 10/13  Patient wondering if ok to try a prostate supplement called Flux Active Complete     To provider to advise      Yesenia CRUMP, RN

## 2022-09-12 ENCOUNTER — TELEPHONE (OUTPATIENT)
Dept: GASTROENTEROLOGY | Facility: CLINIC | Age: 72
End: 2022-09-12

## 2022-09-12 DIAGNOSIS — Z12.11 ENCOUNTER FOR SCREENING COLONOSCOPY: Primary | ICD-10-CM

## 2022-09-12 RX ORDER — BISACODYL 5 MG/1
TABLET, DELAYED RELEASE ORAL
Qty: 4 TABLET | Refills: 0 | Status: SHIPPED | OUTPATIENT
Start: 2022-09-12

## 2022-09-12 NOTE — LETTER
September 12, 2022      Javier Lovett  [address] [facility] [address]              Dear Javier,        Colonoscopy  Your exam is on 9.23.2022    Please note that your procedure time may change  Check in at: [facility]; [address] , [place] [address]    Please arrive with an adult who can drive you home after the exam and stay with you for the next 24 hours, unless your provider says otherwise.   The medicines used in the exam will make you sleepy. You will not be able to drive. You cannot take a medical cab, taxi, Uber, train or bus by yourself.      Covid Testing Requirements:     One to two days before your procedure, take an at-home, rapid antigen test. You can purchase these at many stores and pharmacies. You can order free, at-home tests from the [employer] government by visiting the website covid.gov/tests. If you are unable to find an at-home, rapid antigen test, schedule a PCR test with [facility] by calling [phone]. You can also schedule your PCR test by signing into or signing up for [employer] and completing a COVID-19 symptom check. Once complete, you will get a message sent to your home screen. Use that message to schedule your asymptomatic COVID-19 test. Make sure to choose only COVID-19 testing and indicate you are not symptomatic. Do not choose testing for multiple illnesses (COVID-19, influenza, strep, RSV).    We can t accept at-home, rapid antigen test results that are more than four days old.     If your test is negative, please take a photo of the test. Show the photo to the nurse when you come in for your procedure.     If your test is positive, please call [phone], option 2 to discuss rescheduling options.  You will not be able to complete procedure with a positive test result.      For questions or appointments, call:  [facility]: [phone], option 4  Monday through Friday, 8 a.m. to 4:30 p.m.  (If it is  after hours please reach out to the clinic or provider you were scheduled with.)    ----------------------------------------------------------------------------------------------------------------------------------------------------------------------------------    Prep prescription sent to    [id number] - [place], [place] - [address] AT [facility] OF [facility] [address]        STANDARD Golytely (Colyte, Nulytely)  Prep Instructions for your Colonoscopy  Please read these instructions carefully at least 7 days prior to your colonoscopy procedure. Be sure to follow all directions completely. The inside of your colon must be clean to allow for a complete examination for the presence of any growths, polyps, and/or abnormalities, as well as their biopsy or removal. A number of tips are included in order to make this part of the procedure as comfortable as possible.    Getting ready:     A nurse will call you approximately 1 week before your exam to prescribe your bowel prep and review the prep instructions with you.    You must arrange for an adult to drive you home after your exam. Your colonoscopy cannot be done unless you have a ride. If you need to use public transportation, someone must ride with you and stay with you for a minimum of 6-24 hours.    Check with your insurance company to be sure they will cover this exam.    7 days before the exam:    Talk to your doctor:  If you take blood-thinners (such as Coumadin, Plavix, Xarelto), your prescription or schedule may need to change before the test.    Stop taking fiber supplements, multi-vitamins with iron, and medicines that contain iron.    Continue taking prescribed aspirin; talk to your prescribing doctor with any concerns.    Stop eating corn, nuts and foods with seeds.  These can stay in the colon for days.    If you have diabetes:  Ask to have your exam early in the morning.  Also, ask your doctor if you should change your diet or  medicines.    3 days before the exam:    Begin a low-fiber diet: No raw fruits or vegetables, whole wheat, seeds, nuts, popcorn or other high-fiber foods (see list on page). No binding agents: (bran, Metamucil, Fibercon) and no Olestra (a fat substitute).    One day before the exam:    You can have a light, low-fiber breakfast. But drink only clear liquids after 9 a.m. (see list below). Drink at least 8 to 10 full glasses of clear liquids during the day.     Fill the jug that contains the Golytely powder with warm water. Cover and shake until well mixed. Use a full gallon of water. Chill for 3 hours, but do not add ice.    You will start drinking half of the Golytely solution at 6 p.m. You should drink the other half about 6 hours before your exam. So, the timing of Step 2 will depend on your exam time.     After you start drinking the solution, stay near a toilet. You may have watery stools (diarrhea), mild cramping, bloating , and nausea.     Step One:  o At 3 p.m., take 2 tablets of Dulcolax (bisacodyl).  o At 6 p.m. start drinking the Golytely solution. Drink an 8-ounce glass every 15 minutes until the jug is half empty. Drink each glass quickly.     Step Two:   If you arrive before 11 AM:  At 11 p.m. on the day before your exam:    Take 2 Dulcolax (Bisacodyl) tablets.     Start drinking the other half of the Golytely jug. Drink one 8-ounce glass every 15 minutes until the jug is empty. Drink each glass quickly.  If you arrive after 11 AM:  At 6 AM on the day of the exam:    Take 2 tablets of Dulcolax (Bisacodyl).     Drink the other half of the Golytyel jug.     Drink one 8-ounce glass every 15 minutes until the jug is empty.     Drink each glass quickly.     You should finish the prep 4 hours before the exam.      Day of exam:      You may drink clear liquids only up until 4 hours before your exam.    Do not drink anything 4 hours before your exam, not even water. (If you must take medicine, you can take it  with a sip of water.)     Do not chew or swallow anything including water or gum for at least 4 hours before your exam. If you do, we may cancel the exam for your safety.     Do not take diabetes medicine by mouth until after your exam.    If you have asthma, bring your inhaler.    Arrive with an adult who will take you home after your test. The medicine used will make you sleepy. If you don't have someone to take you home, we will cancel your test.       CLEAR LIQUIDS   You may have:    Water, tea, coffee (no milk or cream)    Soda pop, Gatorade (not red or purple)    Jell-O, Popsicles (no milk or fruit pieces - not red or purple)    Fat-free soup broth or bouillon    Plain hard candy, such as clear life savers (not red or purple)    Clear juices and fruit-flavored drinks, such as apple juice, white grape juice, Hi-C, and Rajiv-Aid (not red or purple)   Do not have:    Milk or milk products such as ice cream, malts or shakes, or coffee creamer    Red or purple drinks of any kind such as cranberry juice or grape juice. Avoid red or purple Jell-O, Popsicles, Rajiv-Aid, sorbet, sherbet and candy    Juices with pulp such as orange, grapefruit, pineapple or tomato juice    Cream soups of any kind    Alcohol and beer    Protein drinks or protein powder     LOW FIBER DIET   You may have:      Starches: White bread, rolls, biscuits, croissants, Earline toast, white flour tortillas, waffles, pancakes, Vietnamese toast; white rice, noodles, pasta, macaroni; cooked and peeled potatoes; plain crackers, saltines; cooked farina or cream of rice; puffed rice, corn flakes, Rice Krispies, Special K     Vegetables: tender cooked and canned, vegetable broths    Fruits and fruit juices: Strained fruit juice, canned fruit without seeds or skin (not pineapple), applesauce, pear sauce, ripe bananas, melons (not watermelon)     Milk products: Milk (plain or flavored), cheese, cottage cheese, yogurt (no berries), custard, ice cream      Proteins:  Tender, well-cooked ground beef, lamb, veal, ham, pork, chicken, turkey, fish or organ meats; eggs; creamy peanut butter     Fats and condiments:  Margarine, butter, oils, mayonnaise, sour cream, salad dressing, plain gravy; spices, cooked herbs; sugar, clear jelly, honey, syrup     Snacks, sweets and drinks: Pretzels, hard candy; plain cakes and cookies (no nuts or seeds); gelatin, plain pudding, sherbet, Popsicles; coffee, tea, carbonated ( fizzy ) drinks Do not have:      Starches: Breads or rolls that contain nuts, seeds or fruit; whole wheat or whole grain breads that contain more than 1 gram of fiber per slice; cornbread; corn or whole wheat tortillas; potatoes with skin; brown rice, wild rice, kasha (buckwheat), and oatmeal     Vegetables: Any raw or steamed vegetables; vegetables with seeds; corn in any form     Fruits and fruit juices: Prunes, prune juice, raisins and other dried fruits, berries and other fruits with seeds, canned pineapple juices with pulp such as orange, grapefruit, pineapple or tomato juice    Milk products: Any yogurt with nuts, seeds or berries     Proteins: Tough, fibrous meats with gristle; cooked dried beans, peas or lentils; crunchy peanut butter    Fats and condiments: Pickles, olives, relish, horseradish; jam, marmalade, preserves     Snacks, sweets and drinks: Popcorn, nuts, seeds, granola, coconut, candies made with nuts or seeds; all desserts that contain nuts, seeds, raisins and other dried fruits, coconut, whole grains or bran.        FAQ:      How do you know if your colon is cleaned out?   o After completing the bowel prep, your bowel movements should be all liquid and yellow. Your bowel movements will look similar to urine in the toilet. If there are pieces of stool (poop) in the toilet, or if you can't see to the bottom of the toilet, please call our office for advice. Call [phone] and ask to speak with a nurse.     Why do you need a responsible  to take you  home and stay with you?  o We require a responsible adult to take you home for your safety. The sedation medicines used to relax you during the procedure can impair your judgement and reaction time, make you forgetful and possible a little unsteady. Do not drive, make any important decisions, or sign any legal documents for 24 hours after your procedure.     It is normal to feel bloated and gassy after your procedure. Walking will help move the air through your colon. You can take non-aspirin pain relievers that contain acetaminophen (Tylenol).     When can you eat after your procedure?  o You may resume your normal diet when you feel ready, unless advised otherwise by the doctor performing your procedure. Do not drink alcohol for 24 hours after your procedure.     You many resume normal activities (work, exercise, etc.) after 24 hours.     When will you get test results?  o You should have your procedure results and any lab results (if applicable) by letter, MyChart message, or phone call within 2 weeks. If you have any questions, please call the doctor that referred you for the procedure.       Thank you for choosing [facility] for your procedure. If you are sent a survey regarding your care, please take the time to complete the questionnaire. We value your feedback!             Updated: 6/22/2022

## 2022-09-12 NOTE — TELEPHONE ENCOUNTER
Patient called [facility] to verify arrival time for upcoming colonoscopy.  Pt is aware a RN will reach out around 1 week prior to procedure.  Need to confirm COVID test and go through bowel prep instructions.  Did pt receive instructions for holding Eliquis from his managing provider?      Pre assessment questions started for upcoming colonoscopy procedure scheduled on 9.23.2022    Reviewed procedural arrival time 0745 and facility location [facility].    Designated  policy reviewed. Instructed to have someone stay 6 hours post procedure.     Anticoagulation/blood thinners? Eliquis; Pt needs to f/u with PCP re: Eliquis dosing prior to procedure.    Electronic implanted devices? pacemaker    Golytely prep is recommended.  Pt denies constipation.  Pt denies using any laxative or stool softening medications.    Caridad Nuno RN

## 2022-09-14 NOTE — TELEPHONE ENCOUNTER
Patient calling regarding Apixaban (Eliquis) holding stating that they reached out to provider and they told him that endoscopy team needs to reach out regarding holding interval.     Informed patient that our holding interval recommendation is 2 days. Patient to confirm with ordering provider ok to hold 2 days. Patient states he will call them back to clarify and had no further questions at this time.     Concepcion Acevedo RN

## 2022-09-16 NOTE — TELEPHONE ENCOUNTER
Pre assessment questions completed for upcoming colonoscopy  procedure scheduled on 9/23/22    COVID policy reviewed. Patient to complete rapid antigen test one to two days before their scheduled procedure. Patient to bring photo of the results when they come in for their procedure.    Reviewed procedural arrival time 0745 and facility location [facility].    Designated  policy reviewed. Instructed to have someone stay 6 hours post procedure.     Reviewed colonoscopy prep instructions in great detail as patient has Extended prep instructions vs Golytely d/t patient denying constipation. No fiber/iron supplements or foods that contain nuts/seeds 7 days prior to procedure.     Anticoagulation/blood thinners? Apixaban (Eliquis). Holding interval of 2 days. Patient verified with provider ok to hold as recommended.     Patient verbalized understanding and had no questions or concerns at this time.    Concepcion Acevedo RN

## 2022-09-23 ENCOUNTER — HOSPITAL ENCOUNTER (OUTPATIENT)
Facility: CLINIC | Age: 72
Discharge: HOME | End: 2022-09-23
Attending: INTERNAL MEDICINE | Admitting: INTERNAL MEDICINE
Payer: MEDICARE

## 2022-09-23 VITALS
OXYGEN SATURATION: 97 % | DIASTOLIC BLOOD PRESSURE: 75 MMHG | RESPIRATION RATE: 14 BRPM | SYSTOLIC BLOOD PRESSURE: 108 MMHG | TEMPERATURE: 97.9 F | HEART RATE: 69 BPM

## 2022-09-23 LAB — COLONOSCOPY: NORMAL

## 2022-09-23 PROCEDURE — G0500 MOD SEDAT ENDO SERVICE >5YRS: HCPCS | Mod: PT | Performed by: INTERNAL MEDICINE

## 2022-09-23 PROCEDURE — 99153 MOD SED SAME PHYS/QHP EA: CPT | Mod: PT | Performed by: INTERNAL MEDICINE

## 2022-09-23 PROCEDURE — 45380 COLONOSCOPY AND BIOPSY: CPT | Performed by: INTERNAL MEDICINE

## 2022-09-23 PROCEDURE — 88305 TISSUE EXAM BY PATHOLOGIST: CPT | Mod: TC | Performed by: INTERNAL MEDICINE

## 2022-09-23 PROCEDURE — 45385 COLONOSCOPY W/LESION REMOVAL: CPT | Mod: PT | Performed by: INTERNAL MEDICINE

## 2022-09-23 PROCEDURE — 250N000011 HC RX IP 250 OP 636: Performed by: INTERNAL MEDICINE

## 2022-09-23 PROCEDURE — 45378 DIAGNOSTIC COLONOSCOPY: CPT | Performed by: INTERNAL MEDICINE

## 2022-09-23 RX ORDER — NALOXONE HYDROCHLORIDE 0.4 MG/ML
0.2 INJECTION, SOLUTION INTRAMUSCULAR; INTRAVENOUS; SUBCUTANEOUS
Status: DISCONTINUED | OUTPATIENT
Start: 2022-09-23 | End: 2022-09-23 | Stop reason: HOSPADM

## 2022-09-23 RX ORDER — FENTANYL CITRATE 50 UG/ML
INJECTION, SOLUTION INTRAMUSCULAR; INTRAVENOUS PRN
Status: COMPLETED | OUTPATIENT
Start: 2022-09-23 | End: 2022-09-23

## 2022-09-23 RX ORDER — ONDANSETRON 2 MG/ML
4 INJECTION INTRAMUSCULAR; INTRAVENOUS
Status: DISCONTINUED | OUTPATIENT
Start: 2022-09-23 | End: 2022-09-23 | Stop reason: HOSPADM

## 2022-09-23 RX ORDER — LIDOCAINE 40 MG/G
CREAM TOPICAL
Status: DISCONTINUED | OUTPATIENT
Start: 2022-09-23 | End: 2022-09-23 | Stop reason: HOSPADM

## 2022-09-23 RX ORDER — PROCHLORPERAZINE MALEATE 5 MG
5 TABLET ORAL EVERY 6 HOURS PRN
Status: DISCONTINUED | OUTPATIENT
Start: 2022-09-23 | End: 2022-09-23 | Stop reason: HOSPADM

## 2022-09-23 RX ORDER — NALOXONE HYDROCHLORIDE 0.4 MG/ML
0.4 INJECTION, SOLUTION INTRAMUSCULAR; INTRAVENOUS; SUBCUTANEOUS
Status: DISCONTINUED | OUTPATIENT
Start: 2022-09-23 | End: 2022-09-23 | Stop reason: HOSPADM

## 2022-09-23 RX ORDER — ONDANSETRON 4 MG/1
4 TABLET, ORALLY DISINTEGRATING ORAL EVERY 6 HOURS PRN
Status: DISCONTINUED | OUTPATIENT
Start: 2022-09-23 | End: 2022-09-23 | Stop reason: HOSPADM

## 2022-09-23 RX ORDER — FLUMAZENIL 0.1 MG/ML
0.2 INJECTION, SOLUTION INTRAVENOUS
Status: DISCONTINUED | OUTPATIENT
Start: 2022-09-23 | End: 2022-09-23 | Stop reason: HOSPADM

## 2022-09-23 RX ORDER — ONDANSETRON 2 MG/ML
4 INJECTION INTRAMUSCULAR; INTRAVENOUS EVERY 6 HOURS PRN
Status: DISCONTINUED | OUTPATIENT
Start: 2022-09-23 | End: 2022-09-23 | Stop reason: HOSPADM

## 2022-09-23 RX ADMIN — MIDAZOLAM 2 MG: 1 INJECTION INTRAMUSCULAR; INTRAVENOUS at 08:27

## 2022-09-23 RX ADMIN — FENTANYL CITRATE 100 MCG: 50 INJECTION, SOLUTION INTRAMUSCULAR; INTRAVENOUS at 08:28

## 2022-09-23 ASSESSMENT — ACTIVITIES OF DAILY LIVING (ADL): ADLS_ACUITY_SCORE: 35

## 2022-09-23 NOTE — OR NURSING
Procedures - Endoscopy   Procedure: Colonoscopy with polypectomy using jumbo forceps and cold snare  Indications: Screening  Therapies/Interventions removal of polyps  Specimens: Collected  yes  Sedation: Fentanyl 100 mcg; Midazolam 2 mg  Sedation Time: 23 minutes  Airway management: nasal cannula 2 liters  Response to procedure:  Tolerated well   SBAR Post procedure to:  [facility] #[id number]        Nicole Rutledge RN, BSN, CGRN  Endoscopy staff [id number]

## 2022-09-23 NOTE — LETTER
September 26, 2022      Javier Lovett  [address] [facility] [address]        Dear ,    The pathology results returned from the polyps removed at your recent colonoscopy.    The polyps were pre-cancerous but showed no active evidence of cancer.      Current guidelines recommend that you undergo a follow-up colonoscopy in 7-10 years.      Based on your prior colonoscopy reports and your age by that time (77-80 years old), you probably do not need another colonoscopy for colorectal cancer screening- you should discuss this further with your primary care provider if you have questions.    Sincerely,               Bryce Mcleod MD   [facility] [place], ENDOSCOPY  [address] [place] [address]  Phone: [phone]

## 2022-09-23 NOTE — H&P
Javier Lovett  [id number]  male  72 year old      Reason for procedure/surgery: screening    Patient Active Problem List   Diagnosis     CARDIOVASCULAR SCREENING; LDL GOAL LESS THAN 100     Hypertrophic cardiomyopathy (H)     MR (mitral regurgitation)     Mood disorder (H)     Atrial fibrillation (H)     Heart murmur       Past Surgical History:  History reviewed. No pertinent surgical history.    Past Medical History: History reviewed. No pertinent past medical history.    Social History:   Social History     Tobacco Use     Smoking status: Never Smoker     Smokeless tobacco: Never Used   Substance Use Topics     Alcohol use: Yes     Alcohol/week: 0.0 standard drinks     Comment: social       Family History:   Family History   Problem Relation Age of Onset     Glaucoma No family hx of      Macular Degeneration No family hx of        Allergies:   Allergies   Allergen Reactions     Penicillins        Active Medications:   No current outpatient medications on file.       Systemic Review:   CONSTITUTIONAL: NEGATIVE for fever, chills, change in weight  ENT/MOUTH: NEGATIVE for ear, mouth and throat problems  RESP: NEGATIVE for significant cough or SOB  CV: NEGATIVE for chest pain, palpitations or peripheral edema    Physical Examination:   Vital Signs: There were no vitals taken for this visit.  GENERAL: healthy, alert and no distress  NECK: no adenopathy, no asymmetry, masses, or scars  RESP: lungs clear to auscultation - no rales, rhonchi or wheezes  CV: regular rate and rhythm, normal S1 S2, no S3 or S4, no murmur, click or rub, no peripheral edema and peripheral pulses strong  ABDOMEN: soft, nontender, no hepatosplenomegaly, no masses and bowel sounds normal  MS: no gross musculoskeletal defects noted, no edema    Plan: Appropriate to proceed as scheduled.      Bryce Mcleod MD  9/23/2022    PCP:  Rafaela Marcano

## 2022-09-26 LAB
PATH REPORT.COMMENTS IMP SPEC: NORMAL
PATH REPORT.COMMENTS IMP SPEC: NORMAL
PATH REPORT.FINAL DX SPEC: NORMAL
PATH REPORT.GROSS SPEC: NORMAL
PATH REPORT.MICROSCOPIC SPEC OTHER STN: NORMAL
PATH REPORT.RELEVANT HX SPEC: NORMAL
PHOTO IMAGE: NORMAL

## 2022-09-26 PROCEDURE — 88305 TISSUE EXAM BY PATHOLOGIST: CPT | Mod: 26 | Performed by: PATHOLOGY

## 2022-10-13 ENCOUNTER — OFFICE VISIT (OUTPATIENT)
Dept: UROLOGY | Facility: CLINIC | Age: 72
End: 2022-10-13
Attending: PHYSICIAN ASSISTANT
Payer: MEDICARE

## 2022-10-13 DIAGNOSIS — R97.20 ELEVATED PROSTATE SPECIFIC ANTIGEN (PSA): Primary | ICD-10-CM

## 2022-10-13 DIAGNOSIS — R39.9 LOWER URINARY TRACT SYMPTOMS (LUTS): ICD-10-CM

## 2022-10-13 LAB — PSA SERPL-MCNC: 5.81 UG/L (ref 0–4)

## 2022-10-13 PROCEDURE — 99204 OFFICE O/P NEW MOD 45 MIN: CPT | Performed by: UROLOGY

## 2022-10-13 PROCEDURE — 84153 ASSAY OF PSA TOTAL: CPT | Performed by: UROLOGY

## 2022-10-13 PROCEDURE — 36415 COLL VENOUS BLD VENIPUNCTURE: CPT | Performed by: UROLOGY

## 2022-10-13 RX ORDER — TAMSULOSIN HYDROCHLORIDE 0.4 MG/1
0.4 CAPSULE ORAL DAILY
Qty: 90 CAPSULE | Refills: 3 | Status: SHIPPED | OUTPATIENT
Start: 2022-10-13 | End: 2023-04-06

## 2022-10-13 NOTE — NURSING NOTE
Javier Lovett's goals for this visit include:   Chief Complaint   Patient presents with     New Patient     Elevated prostate specific antigen (PSA). PSA drawn 08/01/22.     He requests these members of his care team be copied on today's visit information:     PCP: Rafaela Marcano    Referring Provider:  Aubree Toussaint PA-C  [address] [place] [address]    There were no vitals taken for this visit.    Do you need any medication refills at today's visit? Kell Richardson, [facility]

## 2022-10-13 NOTE — PROGRESS NOTES
Urology Consult History and Physical  SHILO MCKENZIE  Name: Javier Lovett    MRN: [id number]   YOB: 1950       We were asked to see Javier Lovett at the request of Dr. Ana Herr for evaluation and treatment of Elevated PSA, LUTS.        Chief Complaint:   Elevated PSA and LUTS    History is obtained from the patient            History of Present Illness:   Javier Lovett is a 72 year old male who is being seen for evaluation of Elevated PSA and LUTS    He does have some LUTS  Slower stream  Increased urgency  Nocturia 2x/night  He does drink 9 glasses of water during the day  He stops drinking at 6pm and goes to bed at 10pm    He is on Eliquis due to hypertrophic cardiomyopathy     No significant family history of prostate cancer            Past Medical History:   No past medical history on file.         Past Surgical History:     Past Surgical History:   Procedure Laterality Date     COLONOSCOPY N/A 9/23/2022    Procedure: COLONOSCOPY;  Surgeon: Bryce Ledbetter MD;  Location:  GI     COLONOSCOPY N/A 9/23/2022    Procedure: COLONOSCOPY, WITH POLYPECTOMY AND BIOPSY;  Surgeon: Bryce Ledbetter MD;  Location:  GI            Social History:     Social History     Tobacco Use     Smoking status: Never     Smokeless tobacco: Never   Substance Use Topics     Alcohol use: Yes     Alcohol/week: 0.0 standard drinks     Comment: social       History   Smoking Status     Never   Smokeless Tobacco     Never            Family History:     Family History   Problem Relation Age of Onset     Glaucoma No family hx of      Macular Degeneration No family hx of               Allergies:     Allergies   Allergen Reactions     Penicillins             Medications:     Current Outpatient Medications   Medication Sig     apixaban ANTICOAGULANT (ELIQUIS) 5 MG tablet Take 5 mg by mouth     bisacodyl (DULCOLAX) 5 MG EC tablet Take as directed. One day before exam take 2 tablets at 3 PM. Take 2 tablets at 11 PM.      cholecalciferol 25 MCG (1000 UT) TABS Take 25 mcg by mouth     CLINDAMYCIN HCL PO Prior to dental work     COVID-19 mRNA vacc, PFIZER, 30 MCG/0.3ML injection      metoprolol succinate ER (TOPROL XL) 25 MG 24 hr tablet      Multiple Vitamins-Minerals (MULTI VITAMIN/MINERALS PO) Take  by mouth daily.     order for DME Equipment being ordered: BP monitor machine     polyethylene glycol (GOLYTELY) 236 g suspension Take as directed. One day before exam fill the jug with water. Cover and shake until well mixed. At 6 PM start drinking an 8oz glass of mixture every 15 minutes until jug is 1/2 empty. Store remainder in the refrigerator.  At 11 PM Start drinking the other half of the Golytely jug. Drink one 8-ounce glass every 15 minutes until the jug is empty.     risperiDONE (RISPERDAL) 0.5 MG tablet      No current facility-administered medications for this visit.             Review of Systems:     Skin: negative  Eyes: negative  Ears/Nose/Throat: negative  Respiratory: No shortness of breath, dyspnea on exertion, cough, or hemoptysis  Cardiovascular: negative  Gastrointestinal: negative  Genitourinary: as above  Musculoskeletal: negative  Neurologic: negative  Psychiatric: negative  Hematologic/Lymphatic/Immunologic: negative  Endocrine: negative          Physical Exam:   No data found.  There is no height or weight on file to calculate BMI.     General: age-appropriate appearing male in NAD  HEENT: Head AT/NC, EOMI, CN Grossly intact  Lungs: no respiratory distress, or pursed lip breathing  Heart: No obvious jugular venous distension present  Back: no bony midline tenderness, no CVAT bilaterally.  Abdomen: soft, non-distended, non-tender. No organomegaly  Lymph: no palpable inguinal lymphadenopathy.  LE: no edema.   Musculoskeltal: extremities normal, no peripheral edema  Skin: no suspicious lesions or rashes  Neuro: Alert, oriented, speech and mentation normal;  moving all 4 extremities equally.  Psych: affect and mood  normal          Data:   All laboratory data reviewed:      Prostate Specific Antigen Screen   Date Value Ref Range Status   08/01/2022 5.92 (H) 0.00 - 4.00 ug/L Final      Lab Results   Component Value Date    CR 1.14 06/21/2017        CR       1.0    7/15/2022         Impression and Plan:   Impression:   72-year-old man with elevated PSA and LUTS      Plan:   Elevated PSA  - We discussed the use of PSA as a screening test for prostate cancer  - I reviewed his PSA which is a single value of 5.92.  We discussed the difficulty interpretation of a single PSA and that ideally this is tracked over time  - Order for repeat PSA placed today and he will have this drawn today  - We discussed that if his PSA is the same or higher, we would then plan for a prostate MRI  - I reviewed his serum creatinine which would allow for IV contrast dye with his most updated value available through care everywhere  - This is an undiagnosed problem with an uncertain prognosis    UPDATE:  -His PSA rechecked today resulted around 5.81  - Order for MRI placed  - Virtual visit follow-up with me after the MRI to discuss the results    LUTS  - We discussed the pathophysiology of the bladder and the prostate and the normal changes associated with the development of BPH and LUTS  - We discussed first-line treatment would be tamsulosin 0.4 mg daily and we discussed the risks and benefits and possible side effects of this medication  - He would like to proceed with a trial of this medication and prescription sent to the pharmacy  - We will continue to monitor his symptoms going forward     Thank you for the kind consultation.    Time spent: 20 minutes spent on the date of the encounter doing chart review, history and exam, documentation and further activities as noted above.    Lee Naranjo MD   Urology  [facility]  [facility] Phone: [phone]  [facility] Phone: [phone]

## 2022-10-18 ENCOUNTER — TELEPHONE (OUTPATIENT)
Dept: UROLOGY | Facility: CLINIC | Age: 72
End: 2022-10-18

## 2022-10-18 NOTE — TELEPHONE ENCOUNTER
Lee Naranjo MD P [facility] Adult [facility] team,     [place] PSA recheck resulted and I would like him to get a prostate MRI and virtual visit follow-up with me to discuss the results.  He is not on MyChart, and so if someone could call him to notify him of this that would be great.     Thanks,   Lee Naranjo M.D.     Received the above message from Dr. Naranjo. Attempted to reach patient by phone, but no answer. Left generic message with request to return call to clinic. When patient returns call, will inform him of the 10/13/22 PSA results and the above note from Dr. Naranjo. Patient can then be scheduled for prostate MRI and virtual follow up after.    Yesenia Camilo RN, BSN

## 2022-10-28 NOTE — TELEPHONE ENCOUNTER
Called and spoke to patient who is aware of the 10/13/22 PSA results and note and recommendations below from Dr. Naranjo. Patient verbalized understanding. Offered to transfer patient now to schedule imaging but patient declined. Imaging scheduling number provided to patient who will call to schedule. Patient aware that a follow up visit with Dr. Naranjo is needed after the prostate MRI to discuss the results.    Yesenia Camilo RN, BSN

## 2022-11-25 ENCOUNTER — TELEPHONE (OUTPATIENT)
Dept: UROLOGY | Facility: CLINIC | Age: 72
End: 2022-11-25

## 2022-11-25 NOTE — TELEPHONE ENCOUNTER
After 3 attempts of calling patient- letter sent for imaging scheduling and follow up.    Becca benavides Clinic Visit Facilitator- Surgical Specialties

## 2022-11-29 ENCOUNTER — ALLIED HEALTH/NURSE VISIT (OUTPATIENT)
Dept: FAMILY MEDICINE | Facility: CLINIC | Age: 72
End: 2022-11-29
Payer: MEDICARE

## 2022-11-29 DIAGNOSIS — B02.9 SHINGLES: Primary | ICD-10-CM

## 2022-11-29 PROCEDURE — 90471 IMMUNIZATION ADMIN: CPT

## 2022-11-29 PROCEDURE — 99207 PR NO CHARGE NURSE ONLY: CPT

## 2022-11-29 PROCEDURE — 90750 HZV VACC RECOMBINANT IM: CPT

## 2023-01-12 ENCOUNTER — DOCUMENTATION ONLY (OUTPATIENT)
Dept: CARDIOLOGY | Facility: CLINIC | Age: 73
End: 2023-01-12
Payer: MEDICARE

## 2023-01-12 NOTE — PROGRESS NOTES
Is the implanted device safe for MRI Exam?  Yes  Is this device 3T compatible? Yes  Device Type: ICD      Device Information:  Make: Medtronic  Model: [place] DR MRI     Cardiology Orders for Device Programming     -- Yes -- The patient has a MRI conditional pulse generator and leads from the same     -- Yes -- The pulse generator and leads have been implanted for at least 6 weeks    -- Yes-- The device is implanted in the right or left pectoral region    -- N/A -- There are not any additional active cardiac devices, abandoned leads, lead extenders or adapters- Not [place] Device patient, needs Chest Xray verification     -- Yes -- The device lead impedance measurements are within the normal range. (Manufacture recommendations: Medtronic Advisa and Revo 200-1,500 ohms; Medtronic ICD and CRT's 200-3000 ohms and defibrillation lead impedance   ohms)    -- N/A -- If the patient is pacemaker dependent the thresholds are less than or equal to 2.0V @ 0.4ms.     Date of last In-clinic Device check: 12/12/2022 (Device check found in Care Everywhere- Media)   Results of last in-office Device check:  1.   Right atrium impedance: 551 Ohms   2.   Right ventricle impedance: 380 Ohms   3.   Left ventricle impedance: n/a      4.   Right atrium threshold: 0.75V @ 0.4 ms   5.   Right ventricle threshold: 0.75V @ 0.4 ms   6.   Left ventricle threshold: n/a      Device programming during the scan guidelines   Pacing Mode (check one): DOO  Pacing Rate: 80  bpm or > intrinsic rates     Aiyana Lassiter RN

## 2023-02-08 NOTE — PROGRESS NOTES
Addendum: MRI scheduled for 3/20/23,  clearance is good for 90 days.   If new Medtronic MRI access Samuel/ Ipad is available at time of scan, OK to use recommended pacing mode from that application/SureScan.     Aiyana Lassiter RN

## 2023-03-04 ENCOUNTER — OFFICE VISIT (OUTPATIENT)
Dept: URGENT CARE | Facility: URGENT CARE | Age: 73
End: 2023-03-04
Payer: MEDICARE

## 2023-03-04 VITALS
TEMPERATURE: 97.7 F | WEIGHT: 191.9 LBS | BODY MASS INDEX: 25.32 KG/M2 | DIASTOLIC BLOOD PRESSURE: 86 MMHG | SYSTOLIC BLOOD PRESSURE: 141 MMHG | HEART RATE: 86 BPM | OXYGEN SATURATION: 95 %

## 2023-03-04 DIAGNOSIS — J20.9 ACUTE BRONCHITIS WITH SYMPTOMS > 10 DAYS: ICD-10-CM

## 2023-03-04 DIAGNOSIS — J01.00 ACUTE NON-RECURRENT MAXILLARY SINUSITIS: Primary | ICD-10-CM

## 2023-03-04 DIAGNOSIS — I48.92 ATRIAL FLUTTER, UNSPECIFIED TYPE (H): ICD-10-CM

## 2023-03-04 DIAGNOSIS — I42.2 HYPERTROPHIC CARDIOMYOPATHY (H): ICD-10-CM

## 2023-03-04 PROCEDURE — 99214 OFFICE O/P EST MOD 30 MIN: CPT | Performed by: STUDENT IN AN ORGANIZED HEALTH CARE EDUCATION/TRAINING PROGRAM

## 2023-03-04 RX ORDER — DOXYCYCLINE 100 MG/1
100 CAPSULE ORAL 2 TIMES DAILY
Qty: 20 CAPSULE | Refills: 0 | Status: SHIPPED | OUTPATIENT
Start: 2023-03-04 | End: 2023-03-14

## 2023-03-04 NOTE — PATIENT INSTRUCTIONS
Start antibiotic doxycycline twice daily for 10 days.    HOLD multivitamin while taking doxycycline.     Ask pharmacist whether to take antibiotic with food.    Return to clinic if your symptoms are worsening or you are having new symptoms such as fever, chills, vomiting, fatigue, etc.    Cher Morgan, CNP

## 2023-03-04 NOTE — PROGRESS NOTES
Assessment & Plan     Acute non-recurrent maxillary sinusitis  Hypertrophic cardiomyopathy (H)  Atrial flutter, unspecified type (H)  Patient has history of hypertrophic cardiomyopathy, a-fib and a-flutter. On exam he has symptoms to suggest sinus infection and also has some wheezing in the bilateral lower lobes. He denies chest pain or shortness of breath. I am concerned that the sinus drainage is triggering lung infection and given his PMH he is increased risk from complications of infection and increased risk for pneumonia. Unfortunately we do not have x-ray here in clinic today so I am unable to get a chest x-ray. I am going to treat for both sinus infection and possible pneumonia with doxycycline and advised close monitoring of symptoms and close follow up if he is not improving or if his symptoms are worsening. Patient understands and agrees with plan of care.   - doxycycline hyclate (VIBRAMYCIN) 100 MG capsule  Dispense: 20 capsule; Refill: 0    Acute bronchitis with symptoms > 10 days  See above notes  - doxycycline hyclate (VIBRAMYCIN) 100 MG capsule  Dispense: 20 capsule; Refill: 0      No follow-ups on file.    Jessy Morgan, [facility]    Marisela Herrera is a 72 year old male who presents to clinic today for the following health issues:  Chief Complaint   Patient presents with     Sinus Problem     Sx cough and draining of sinus for 1 week, pt said he is coughing up blood and blowing his nose and he see blood. Pt said he is having to sleep upright in chair     HPI          Review of Systems  Constitutional, HEENT, cardiovascular, pulmonary, GI, , musculoskeletal, neuro, skin, endocrine and psych systems are negative, except as otherwise noted.      Objective    BP (!) 141/86 (BP Location: Left arm, Patient Position: Sitting, Cuff Size: Adult Regular)   Pulse 86   Temp 97.7  F (36.5  C) (Tympanic)   Wt 87 kg (191 lb 14.4 oz)   SpO2 95%   BMI  25.32 kg/m    Physical Exam   GENERAL: alert and no distress  EYES: Eyes grossly normal to inspection, PERRL and conjunctivae and sclerae normal  HENT: normal cephalic/atraumatic, ear canals and TM's normal, nasal mucosa edematous , rhinorrhea yellow, oral mucous membranes moist and sinuses: maxillary tenderness on bilaterally  NECK: no adenopathy, no asymmetry, masses, or scars and thyroid normal to palpation  RESP: lungs clear to auscultation - no rales, rhonchi or wheezes in BULs and expiratory wheezes BLLs  CV: regular rate and rhythm, normal S1 S2, no S3 or S4, no murmur, click or rub  MS: no gross musculoskeletal defects noted, no edema  SKIN: no suspicious lesions or rashes  NEURO: Normal strength and tone, mentation intact and speech normal  PSYCH: mentation appears normal, affect normal/bright

## 2023-03-07 ENCOUNTER — OFFICE VISIT (OUTPATIENT)
Dept: URGENT CARE | Facility: URGENT CARE | Age: 73
End: 2023-03-07
Payer: MEDICARE

## 2023-03-07 VITALS
SYSTOLIC BLOOD PRESSURE: 114 MMHG | RESPIRATION RATE: 18 BRPM | DIASTOLIC BLOOD PRESSURE: 77 MMHG | TEMPERATURE: 98 F | BODY MASS INDEX: 25.6 KG/M2 | HEART RATE: 95 BPM | WEIGHT: 194 LBS | OXYGEN SATURATION: 98 %

## 2023-03-07 DIAGNOSIS — R04.0 EPISTAXIS: ICD-10-CM

## 2023-03-07 DIAGNOSIS — J01.90 ACUTE SINUSITIS WITH COEXISTING CONDITION, NEED PROPHYLACTIC TREATMENT: Primary | ICD-10-CM

## 2023-03-07 PROCEDURE — 99213 OFFICE O/P EST LOW 20 MIN: CPT | Performed by: PHYSICIAN ASSISTANT

## 2023-03-07 RX ORDER — CETIRIZINE HYDROCHLORIDE 10 MG/1
10 TABLET ORAL DAILY
Qty: 30 TABLET | Refills: 0 | Status: SHIPPED | OUTPATIENT
Start: 2023-03-07

## 2023-03-07 ASSESSMENT — ENCOUNTER SYMPTOMS
PALPITATIONS: 0
SINUS PAIN: 0
WHEEZING: 0
GASTROINTESTINAL NEGATIVE: 1
SINUS PRESSURE: 1
CHILLS: 0
SORE THROAT: 0
FATIGUE: 0
RHINORRHEA: 1
COUGH: 1
FEVER: 0
CHEST TIGHTNESS: 0
CARDIOVASCULAR NEGATIVE: 1
SHORTNESS OF BREATH: 0

## 2023-03-07 NOTE — PROGRESS NOTES
"  Marisela Herrera is a 72 year old, presenting for the following health issues:  Sinus Problem (Patient seen a few days ago for a sinus infection and placed on antibiotics. Patient reports that he has nasal discharge that has blood in it at nighttime. Patient states that he is not worsening but \"it's not getting rid of the drainage.\")    HPI   Acute Illness  Acute illness concerns:   Onset/Duration: 5days.  Was initially seen in urgent care 5days ago in which he was given doxy with some relief but continues to have sinus drainage.  Symptoms:  Fever: No  Chills/Sweats: No  Headache (location?): No  Sinus Pressure: YES  Conjunctivitis:  No  Ear Pain: no  Rhinorrhea: YES with bloody drainage  Congestion: YES  Sore Throat: No  Cough: YES-productive of yellow sputum  Wheeze: No  Decreased Appetite: No  Nausea: No  Vomiting: No  Diarrhea: No  Dysuria/Freq.: No  Dysuria or Hematuria: No  Fatigue/Achiness: No  Sick/Strep Exposure: No  Therapies tried and outcome: rest, fluids, doxy with some relief    Patient Active Problem List   Diagnosis     CARDIOVASCULAR SCREENING; LDL GOAL LESS THAN 100     Hypertrophic cardiomyopathy (H)     MR (mitral regurgitation)     Mood disorder (H)     Atrial fibrillation (H)     Heart murmur     Current Outpatient Medications   Medication     apixaban ANTICOAGULANT (ELIQUIS) 5 MG tablet     bisacodyl (DULCOLAX) 5 MG EC tablet     cholecalciferol 25 MCG (1000 UT) TABS     CLINDAMYCIN HCL PO     COVID-19 mRNA vacc, PFIZER, 30 MCG/0.3ML injection     doxycycline hyclate (VIBRAMYCIN) 100 MG capsule     metoprolol succinate ER (TOPROL XL) 25 MG 24 hr tablet     Multiple Vitamins-Minerals (MULTI VITAMIN/MINERALS PO)     order for DME     polyethylene glycol (GOLYTELY) 236 g suspension     risperiDONE (RISPERDAL) 0.5 MG tablet     tamsulosin (FLOMAX) 0.4 MG capsule     No current facility-administered medications for this visit.        Allergies   Allergen Reactions     Penicillins      Review of " Systems   Constitutional: Negative for chills, fatigue and fever.   HENT: Positive for congestion, nosebleeds, rhinorrhea and sinus pressure. Negative for ear discharge, ear pain, hearing loss, sinus pain and sore throat.    Respiratory: Positive for cough. Negative for chest tightness, shortness of breath and wheezing.    Cardiovascular: Negative.  Negative for chest pain, palpitations and peripheral edema.   Gastrointestinal: Negative.    All other systems reviewed and are negative.           Objective    /77 (BP Location: Left arm, Patient Position: Sitting, Cuff Size: Adult Regular)   Pulse 95   Temp 98  F (36.7  C) (Tympanic)   Resp 18   Wt 88 kg (194 lb)   SpO2 98%   BMI 25.60 kg/m    Body mass index is 25.6 kg/m .  Physical Exam  Vitals and nursing note reviewed.   Constitutional:       General: He is not in acute distress.     Appearance: Normal appearance. He is normal weight. He is not ill-appearing.   HENT:      Head: Normocephalic and atraumatic.      Ears:      Comments: TMs are intact without any erythema or bulging bilaterally.  Airway is patent.     Nose: Congestion and rhinorrhea present. Rhinorrhea is purulent.      Right Nostril: No epistaxis.      Left Nostril: Epistaxis present.      Right Turbinates: Swollen.      Left Turbinates: Swollen.      Mouth/Throat:      Lips: Pink.      Mouth: Mucous membranes are moist.      Pharynx: Oropharynx is clear. Uvula midline. No pharyngeal swelling, oropharyngeal exudate, posterior oropharyngeal erythema or uvula swelling.      Tonsils: No tonsillar exudate or tonsillar abscesses.   Eyes:      General: No scleral icterus.     Conjunctiva/sclera: Conjunctivae normal.      Pupils: Pupils are equal, round, and reactive to light.   Neck:      Thyroid: No thyromegaly.   Cardiovascular:      Rate and Rhythm: Normal rate and regular rhythm.      Pulses: Normal pulses.      Heart sounds: Normal heart sounds, S1 normal and S2 normal. No murmur heard.     No friction rub. No gallop.   Pulmonary:      Effort: Pulmonary effort is normal. No tachypnea, accessory muscle usage, respiratory distress or retractions.      Breath sounds: Normal breath sounds and air entry. No stridor. No decreased breath sounds, wheezing, rhonchi or rales.   Musculoskeletal:      Cervical back: Normal range of motion and neck supple.   Lymphadenopathy:      Cervical: No cervical adenopathy.   Skin:     General: Skin is warm and dry.      Findings: No rash.   Neurological:      Mental Status: He is alert and oriented to person, place, and time.   Psychiatric:         Mood and Affect: Mood normal.         Behavior: Behavior normal.         Thought Content: Thought content normal.         Judgment: Judgment normal.            Assessment/Plan:  Acute sinusitis with coexisting condition, need prophylactic treatment:  He is currently on doxycycline. Recommend tylenol/ibuprofen prn pain/fever and will add zyrtec/steam/humidifier for sinus congestion/drainage.   Rest, fluids, chicken soup.  Recheck in clinic if symptoms worsen or if symptoms do not improve.    -     cetirizine (ZYRTEC) 10 MG tablet; Take 1 tablet (10 mg) by mouth daily    Epistaxis:  No current nose bleed.  Will give bactroban nasal ointment for MRSA.  -     mupirocin (BACTROBAN) 2 % nasal ointment; Spray 1 g into both nostrils 2 times daily for 7 days        Desiree Aguilar PA-C

## 2023-03-20 ENCOUNTER — ANCILLARY ORDERS (OUTPATIENT)
Dept: UROLOGY | Facility: CLINIC | Age: 73
End: 2023-03-20

## 2023-03-20 ENCOUNTER — TRANSFERRED RECORDS (OUTPATIENT)
Dept: HEALTH INFORMATION MANAGEMENT | Facility: CLINIC | Age: 73
End: 2023-03-20

## 2023-03-20 ENCOUNTER — HOSPITAL ENCOUNTER (OUTPATIENT)
Dept: RADIOLOGY | Facility: HOSPITAL | Age: 73
Discharge: HOME | End: 2023-03-20
Attending: UROLOGY
Payer: MEDICARE

## 2023-03-20 ENCOUNTER — HOSPITAL ENCOUNTER (OUTPATIENT)
Dept: MRI IMAGING | Facility: HOSPITAL | Age: 73
Discharge: HOME | End: 2023-03-20
Attending: UROLOGY
Payer: MEDICARE

## 2023-03-20 VITALS — HEART RATE: 84 BPM | OXYGEN SATURATION: 100 %

## 2023-03-20 DIAGNOSIS — R97.20 ELEVATED PROSTATE SPECIFIC ANTIGEN (PSA): ICD-10-CM

## 2023-03-20 PROCEDURE — 72197 MRI PELVIS W/O & W/DYE: CPT | Mod: MG

## 2023-03-20 PROCEDURE — 999N000065 XR CHEST 2 VIEWS

## 2023-03-20 PROCEDURE — 255N000002 HC RX 255 OP 636: Performed by: UROLOGY

## 2023-03-20 PROCEDURE — A9585 GADOBUTROL INJECTION: HCPCS | Performed by: UROLOGY

## 2023-03-20 RX ORDER — GADOBUTROL 604.72 MG/ML
9 INJECTION INTRAVENOUS ONCE
Status: COMPLETED | OUTPATIENT
Start: 2023-03-20 | End: 2023-03-20

## 2023-03-20 RX ADMIN — GADOBUTROL 9 ML: 604.72 INJECTION INTRAVENOUS at 08:36

## 2023-03-20 NOTE — IR NOTE
Patient's device placed into MRI Surescan mode prior to scan.  Patient monitored by RN via EKG and pulseoximetry throughout procedure.  Patient stable throughout.  Device placed back into previous mode at completion of MRI.

## 2023-03-20 NOTE — PROGRESS NOTES
Patient placed in pacemaker safe mode using Surescan device. PPM Rate at 85 bpm. Patient is awake and alert. O2 sat 95%.

## 2023-03-22 ENCOUNTER — TELEPHONE (OUTPATIENT)
Dept: UROLOGY | Facility: CLINIC | Age: 73
End: 2023-03-22
Payer: MEDICARE

## 2023-03-22 NOTE — TELEPHONE ENCOUNTER
Spoke with patient to schedule a follow up visit with Dr. Naranjo to go over MRI results and complete a AUA/PVR. Pt stated that he is going to check his schedule and contact our team about the hold on 04/06/23 at 9:15AM. Pt was instructed to call back if he would like to proceed with appointment or if we need to schedule on a different day.   Patti Piper LPN on 3/22/2023 at 4:41 PM

## 2023-04-06 ENCOUNTER — OFFICE VISIT (OUTPATIENT)
Dept: UROLOGY | Facility: CLINIC | Age: 73
End: 2023-04-06
Payer: MEDICARE

## 2023-04-06 DIAGNOSIS — R97.20 ELEVATED PROSTATE SPECIFIC ANTIGEN (PSA): Primary | ICD-10-CM

## 2023-04-06 DIAGNOSIS — R39.9 LOWER URINARY TRACT SYMPTOMS (LUTS): ICD-10-CM

## 2023-04-06 LAB — PSA SERPL-MCNC: 4.95 UG/L (ref 0–4)

## 2023-04-06 PROCEDURE — 51798 US URINE CAPACITY MEASURE: CPT | Performed by: UROLOGY

## 2023-04-06 PROCEDURE — 84153 ASSAY OF PSA TOTAL: CPT | Performed by: UROLOGY

## 2023-04-06 PROCEDURE — 36415 COLL VENOUS BLD VENIPUNCTURE: CPT | Performed by: UROLOGY

## 2023-04-06 PROCEDURE — 99214 OFFICE O/P EST MOD 30 MIN: CPT | Mod: 25 | Performed by: UROLOGY

## 2023-04-06 RX ORDER — ALFUZOSIN HYDROCHLORIDE 10 MG/1
10 TABLET, EXTENDED RELEASE ORAL DAILY
Qty: 90 TABLET | Refills: 3 | Status: SHIPPED | OUTPATIENT
Start: 2023-04-06 | End: 2023-04-21 | Stop reason: ALTCHOICE

## 2023-04-06 NOTE — PROGRESS NOTES
[facility]   CHIEF COMPLAINT   It was my pleasure to see Javier Lovett who is a 72 year old male for follow-up of Elevated PSA and LUTS.      HPI   Javier Lovett is a very pleasant 72 year old male     Initially seen 10/13/2022:  Javier Lovett is a 72 year old male who is being seen for evaluation of Elevated PSA and LUTS     He does have some LUTS  Slower stream  Increased urgency  Nocturia 2x/night  He does drink 9 glasses of water during the day  He stops drinking at 6pm and goes to bed at 10pm     He is on Eliquis due to hypertrophic cardiomyopathy      No significant family history of prostate cancer     TODAY 4/6/2023:  He stopped tamsulosin 0.4mg (Flomax) after concern for hypotension in December - he was also diagnosed with COVID at that time as well  He did note an improved flow and stronger stream with tamsulosin 0.4mg (Flomax)   He has now been off of tamsulosin  He does have baseline slight dizziness and orthostatic hypotension    AUASS: 7-4-8-2-3-2-2/3 = 19/20  QOL = 3  PVR = 80    PHYSICAL EXAM  Patient is a 72 year old  male   Vitals: There were no vitals taken for this visit.  There is no height or weight on file to calculate BMI.  General Appearance Adult:   Alert, no acute distress, oriented  HENT: throat/mouth:normal, good dentition  Lungs: no respiratory distress, or pursed lip breathing  Heart: No obvious jugular venous distension present  Abdomen: soft, nontender, no organomegaly or masses  Musculoskeltal: extremities normal, no peripheral edema  Skin: no suspicious lesions or rashes  Neuro: Alert, oriented, speech and mentation normal  Psych: affect and mood normal  Gait: with a andrews       PSA   Latest Ref Rng 0.00 - 4.00 ug/L   8/1/2022 5.92 (H)    10/13/2022 5.81 (H)       Creatinine   Date Value Ref Range Status   06/21/2017 1.14 0.66 - 1.25 mg/dL Final      IMAGING:  All pertinent imaging reviewed:    All imaging studies reviewed by me.  I personally reviewed these imaging films.  A formal  report from radiology will follow.    MRI PROSTATE 3/20/2023:  FINDINGS:  PERIPHERAL ZONE: No foci of restricted diffusion are identified to suggest clinically significant malignancy.     TRANSITIONAL ZONE: Moderate BPH changes secondary to benign prostatic hypertrophy. No suspicious lesions.     No seminal vesicle invasion.  No pelvic lymphadenopathy.  No lesions in the visualized bones.  Sigmoid diverticulosis without acute diverticulitis.     PROSTATE GLAND VOLUME: 104 cc                                                     IMPRESSION:  1.  Prostate MRI is assigned PI-RADS CATEGORY 2: Low. Clinically significant cancer is unlikely to be present.                ASSESSMENT and PLAN  72-year-old man with evidence of BPH and LUTS with an elevated PSA    BPH with LUTS  - We again reviewed the pathophysiology of the bladder and the prostate and the normal changes associated with the development of BPH and LUTS  - He did note symptom improvement with tamsulosin 0.4 mg daily, however he did have some worsening of his orthostatic hypotension and it was unclear if this could be related  - We discussed a trial of alfuzosin 10 mg daily given that this medication has less risk for orthostatic hypotension  - Prescription sent to the pharmacy, and I do recommend that he double check with his cardiologist prior to starting this medication  - We discussed that if his symptoms worsen despite medication or if he is unable to tolerate medication we could discuss bladder outlet procedural options  - He does have an enlarged prostate on MRI at 104 g    Elevated PSA  - I reviewed his PSA history and trend  - Reviewed his MRI and reviewed these images personally.  I agree with radiologist interpretation.  We discussed that it is reassuring that there are no PI-RADS 3 or greater lesions  - His prostate size is 104 g making his PSA density less than 0.1 which is very reassuring  - We will plan for PSA recheck today and I will send him  these results      Time spent: 20 minutes spent on the date of the encounter doing chart review, history and exam, documentation and further activities as noted above.    Lee Naranjo MD   Urology  [facility]  [facility] Phone: [phone]  [facility] Phone: [phone]

## 2023-04-06 NOTE — NURSING NOTE
Javier Lovett's goals for this visit include:   Chief Complaint   Patient presents with     RECHECK     MRI results        He requests these members of his care team be copied on today's visit information:       PCP: Rafaela Marcano    Referring Provider:  No referring provider defined for this encounter.    post void residual: 80 ml     Do you need any medication refills at today's visit?     Analisa Jalloh LPN on 4/6/2023 at 9:06 AM

## 2023-04-17 ENCOUNTER — TELEPHONE (OUTPATIENT)
Dept: UROLOGY | Facility: CLINIC | Age: 73
End: 2023-04-17
Payer: MEDICARE

## 2023-04-17 NOTE — TELEPHONE ENCOUNTER
Lee Naranjo MD  P [facility] [place]  Please contact the patient to inform them of their normal PSA results.     Plan   -If he is doing well he can follow-up with me in 1 year     Thanks,   Lee MAI. MD Marcella     Attempted to reach pt.  Left detailed message to call clinic back at [phone].   Informed pt of message.     Tiffanie Ceja, MSN RN

## 2023-04-19 ENCOUNTER — TELEPHONE (OUTPATIENT)
Dept: UROLOGY | Facility: CLINIC | Age: 73
End: 2023-04-19
Payer: MEDICARE

## 2023-04-19 DIAGNOSIS — R39.9 LOWER URINARY TRACT SYMPTOMS (LUTS): ICD-10-CM

## 2023-04-19 DIAGNOSIS — R97.20 ELEVATED PROSTATE SPECIFIC ANTIGEN (PSA): Primary | ICD-10-CM

## 2023-04-19 NOTE — TELEPHONE ENCOUNTER
[facility] Call Center    Phone Message    May a detailed message be left on voicemail: yes     Reason for Call: Medication Question or concern regarding medication   Prescription Clarification  Name of Medication: alfuzosin ER (UROXATRAL) 10 MG 24 hr tablet  Prescribing Provider: Dr. Naranjo   What on the order needs clarification? Patient called stating his cardiologist had him go off above med because it is lower his blood pressure too much.    Action Taken: Message routed to:  Other: [facility]    Travel Screening: Not Applicable

## 2023-04-21 DIAGNOSIS — R97.20 ELEVATED PROSTATE SPECIFIC ANTIGEN (PSA): ICD-10-CM

## 2023-04-21 DIAGNOSIS — R39.9 LOWER URINARY TRACT SYMPTOMS (LUTS): ICD-10-CM

## 2023-04-21 RX ORDER — FINASTERIDE 5 MG/1
5 TABLET, FILM COATED ORAL DAILY
Qty: 30 TABLET | Refills: 5 | Status: SHIPPED | OUTPATIENT
Start: 2023-04-21 | End: 2024-06-20

## 2023-04-21 RX ORDER — FINASTERIDE 5 MG/1
TABLET, FILM COATED ORAL
Qty: 90 TABLET | OUTPATIENT
Start: 2023-04-21

## 2023-04-21 NOTE — TELEPHONE ENCOUNTER
Samaria Naranjo MD Bratsch, Angie J, RN  Cc: P [facility] [place]  Caller: Unspecified (2 days ago,  3:17 PM)  Hi team,     Okay, he should stop the alfuzosin.  We could try finasteride 5 mg daily as this does not impact his blood pressure.  I would then like to see him back in 3 to 6 months for symptom check.     Thanks,   Samaria MAI. Marcella LIZARRAGA     Spoke to pt. He agrees to try new medication. Order sent to preferred pharmacy. No other questions noted.     Tiffanie Woodall, MSN RN    Signed Prescriptions:                        Disp   Refills    finasteride (PROSCAR) 5 MG tablet          30 tab*5        Sig: Take 1 tablet (5 mg) by mouth daily  Authorizing Provider: SAMARIA NARANJO  Ordering User: TIFFANIE WOODALL

## 2023-08-07 ENCOUNTER — TELEPHONE (OUTPATIENT)
Dept: UROLOGY | Facility: CLINIC | Age: 73
End: 2023-08-07
Payer: MEDICARE

## 2023-08-07 NOTE — TELEPHONE ENCOUNTER
Left Voicemail (1st Attempt) for the patient to call back and schedule the following:    Appointment type: return  Provider: dr. rivera  Return date: 4/6/2024   Specialty phone number: [phone]  Additonal Notes: yearly follow up     Bri benavides Procedure   Orthopedics, Podiatry, Sports Medicine, Ent ,Eye , Audiology, Adult Endocrine & Diabetes, Nutrition & Medication Therapy Management Specialties   [facility]

## 2024-01-25 ENCOUNTER — TELEPHONE (OUTPATIENT)
Dept: FAMILY MEDICINE | Facility: CLINIC | Age: 74
End: 2024-01-25
Payer: MEDICARE

## 2024-01-25 NOTE — TELEPHONE ENCOUNTER
Patient Quality Outreach    Patient is due for the following:   Physical Annual Wellness Visit      Topic Date Due    Flu Vaccine (1) 09/01/2023    COVID-19 Vaccine (4 - 2023-24 season) 09/01/2023       Next Steps:   Schedule a Annual Wellness Visit    Type of outreach:    Sent letter.      Questions for provider review:    None           Marla Bustos MA

## 2024-01-25 NOTE — LETTER
[address] [address]  [phone]  Dept: [phone]    January 25, 2024    Javier Lovett  [address] [address]    Dear Javier Lovett,     At [facility] we care about your health and are committed to providing quality patient care.    Which includes staying current on preventive cancer screenings.  You can increase your chances of finding and treating cancers through regular screenings.      Our records indicate you may be due for the following preventive screening(s):  Physical Annual Wellness Visit      Topic Date Due    Flu Vaccine (1) 09/01/2023    COVID-19 Vaccine (4 - 2023-24 season) 09/01/2023       To schedule an appointment or discuss this screening further, you may contact us by phone at the [facility] at [phone] or online through the patient [email or web address] @ [email or web address]    If you have had any of the screenings listed above at another facility, please call us so that we may update your chart.      Your partners in health,      Quality Committee at [facility]

## 2024-06-20 ENCOUNTER — DOCUMENTATION ONLY (OUTPATIENT)
Dept: FAMILY MEDICINE | Facility: CLINIC | Age: 74
End: 2024-06-20

## 2024-06-20 ENCOUNTER — LAB (OUTPATIENT)
Dept: LAB | Facility: CLINIC | Age: 74
End: 2024-06-20
Payer: MEDICARE

## 2024-06-20 ENCOUNTER — OFFICE VISIT (OUTPATIENT)
Dept: UROLOGY | Facility: CLINIC | Age: 74
End: 2024-06-20
Payer: MEDICARE

## 2024-06-20 ENCOUNTER — TELEPHONE (OUTPATIENT)
Dept: UROLOGY | Facility: CLINIC | Age: 74
End: 2024-06-20

## 2024-06-20 DIAGNOSIS — R97.20 ELEVATED PROSTATE SPECIFIC ANTIGEN (PSA): Primary | ICD-10-CM

## 2024-06-20 DIAGNOSIS — R39.9 LOWER URINARY TRACT SYMPTOMS (LUTS): ICD-10-CM

## 2024-06-20 DIAGNOSIS — R97.20 ELEVATED PROSTATE SPECIFIC ANTIGEN (PSA): ICD-10-CM

## 2024-06-20 LAB — PSA SERPL DL<=0.01 NG/ML-MCNC: 7.15 NG/ML (ref 0–6.5)

## 2024-06-20 PROCEDURE — 36415 COLL VENOUS BLD VENIPUNCTURE: CPT

## 2024-06-20 PROCEDURE — 99214 OFFICE O/P EST MOD 30 MIN: CPT | Mod: 25 | Performed by: UROLOGY

## 2024-06-20 PROCEDURE — 84153 ASSAY OF PSA TOTAL: CPT

## 2024-06-20 PROCEDURE — 51798 US URINE CAPACITY MEASURE: CPT | Performed by: UROLOGY

## 2024-06-20 RX ORDER — FINASTERIDE 5 MG/1
5 TABLET, FILM COATED ORAL DAILY
Qty: 90 TABLET | Refills: 3 | Status: SHIPPED | OUTPATIENT
Start: 2024-06-20 | End: 2025-06-20

## 2024-06-20 RX ORDER — ASCORBIC ACID 500 MG
1500 TABLET ORAL
COMMUNITY

## 2024-06-20 NOTE — TELEPHONE ENCOUNTER
Please place order for PSA. Spoke with patient- he will come in at 1:30 PM for his lab draw prior to his appointment with Dr. Naranjo.He is aware his lab will not be resulted at appointment time and Dr. Naranjo will communicate PSA result once it is resulted.    Becca benavides Clinic Assistant- Surgical Specialties

## 2024-06-20 NOTE — NURSING NOTE
Javier Lovett's goals for this visit include:   Chief Complaint   Patient presents with    RECHECK     1 year follow up; LUTS, medical records in Epic; scheduled with pt; location verified, lab appt prior for PSA          He requests these members of his care team be copied on today's visit information:     PCP: Rafaela Marcano    Referring Provider:  Referred Self, MD  No address on file    There were no vitals taken for this visit.    Do you need any medication refills at today's visit?     post void residual: 11 mL    Katrina Hernandez MA on 6/20/2024 at 1:41 PM

## 2024-06-20 NOTE — PROGRESS NOTES
I have not seen patient in 2 years and no appointment with me in the future.  Please send to an appropriate provider    Aubree Toussaint PAC

## 2024-06-20 NOTE — PROGRESS NOTES
Javier Lovett has an upcoming lab appointment:    Future Appointments   Date Time Provider Department Center   6/20/2024  1:30 PM LAB FIRST FLOOR [facility] [place]   6/20/2024  1:45 PM Lee Naranjo MD [facility]     Patient is scheduled for the following lab(s): Pt is requesting labs for TODAY with no orders in chart. Per Pt appt notes they are requesting a UA.     There is no order available. Please review and place either future orders or HMPO (Review of Health Maintenance Protocol Orders), as appropriate.    Health Maintenance Due   Topic    CMP     LIPID     ANNUAL REVIEW OF HM ORDERS      Thank you, Jeanette Fulton

## 2024-06-20 NOTE — PROGRESS NOTES
[facility]   CHIEF COMPLAINT   It was my pleasure to see Javier Lovett who is a 73 year old male for follow-up of Elevated PSA and LUTS.      HPI   Javier Lovett is a very pleasant 73 year old male     Initially seen 10/13/2022:  Javier Lovett is a 72 year old male who is being seen for evaluation of Elevated PSA and LUTS     He does have some LUTS  Slower stream  Increased urgency  Nocturia 2x/night  He does drink 9 glasses of water during the day  He stops drinking at 6pm and goes to bed at 10pm     He is on Eliquis due to hypertrophic cardiomyopathy      No significant family history of prostate cancer     4/6/2023:  He stopped tamsulosin 0.4mg (Flomax) after concern for hypotension in December - he was also diagnosed with COVID at that time as well  He did note an improved flow and stronger stream with tamsulosin 0.4mg (Flomax)   He has now been off of tamsulosin  He does have baseline slight dizziness and orthostatic hypotension    AUASS: 3-3-3-2-3-2-2/3 = 19/20  QOL = 3  PVR = 80    TODAY 6/20/2024:  Trial of alfuzoin at last visit with issues with blood pressure  Started on finasteride 5mg (Proscar) in April 2023  Taking every every other day  Ran out in April and has been off of this since  He notes if he well hydrated his flow is better    AUASS: 1-5-2-4-4-3-2 = 26  QOL = 4  PVR = 11cc    PHYSICAL EXAM  Patient is a 73 year old  male   Vitals: There were no vitals taken for this visit.  There is no height or weight on file to calculate BMI.  General Appearance Adult:   Alert, no acute distress, oriented  HENT: throat/mouth:normal, good dentition  Lungs: no respiratory distress, or pursed lip breathing  Heart: No obvious jugular venous distension present  Abdomen: soft, nontender, no organomegaly or masses  Musculoskeltal: extremities normal, no peripheral edema  Neuro: Alert, oriented, speech and mentation normal  Psych: affect and mood normal  Gait: with a andrews     PSA PSA Tumor Marker   Latest Ref Rng 0.00  - 4.00 ug/L 0.00 - 6.50 ng/mL   8/1/2022 5.92 (H)     10/13/2022 5.81 (H)     4/6/2023 4.95 (H)     6/20/2024  7.15 (H)          Creatinine   Date Value Ref Range Status   06/21/2017 1.14 0.66 - 1.25 mg/dL Final      IMAGING:  All pertinent imaging reviewed:    All imaging studies reviewed by me.  I personally reviewed these imaging films.  A formal report from radiology will follow.    MRI PROSTATE 3/20/2023:  FINDINGS:  PERIPHERAL ZONE: No foci of restricted diffusion are identified to suggest clinically significant malignancy.     TRANSITIONAL ZONE: Moderate BPH changes secondary to benign prostatic hypertrophy. No suspicious lesions.     No seminal vesicle invasion.  No pelvic lymphadenopathy.  No lesions in the visualized bones.  Sigmoid diverticulosis without acute diverticulitis.     PROSTATE GLAND VOLUME: 104 cc                                                     IMPRESSION:  1.  Prostate MRI is assigned PI-RADS CATEGORY 2: Low. Clinically significant cancer is unlikely to be present.                ASSESSMENT and PLAN  73-year-old man with evidence of BPH and LUTS with an elevated PSA    BPH with LUTS  -We discussed the pathophysiology of the bladder and the prostate and the changes associated with the development of BPH and LUTS  -Again reviewed that he is unable to take alpha blockers given the effects on his blood pressure  - He tolerated finasteride 5 mg, though he was only taking every other day and has been off of this for the last 2 months  - Refill prescription sent to the pharmacy and I advised him that he should take this every day as this medication does not interfere with any blood pressure medication or cause any blood pressure changes  - We discussed that if his urinary symptoms remain bothersome despite medication, we would then need to consider a bladder outlet procedure if our prostate cancer concern has been addressed    Elevated PSA  -I reviewed his PSA history and trend  - His PSA today  carmelina to 7.15 from 4.95 last year  - We again reviewed the expected changes with finasteride, though he was only taking this every other day and has been off of this medication for the last 2 months  - We discussed that this makes his PSA today somewhat difficult to interpret  - I reviewed his prior MRI and reviewed these images personally.  I agree with radiologist interpretation and there is no PI-RADS 3 or greater lesions with a prostate size of 104 cc  - We discussed that I would like him to resume on finasteride daily and will plan for PSA recheck in 3 months  - He does not have MyChart so we will need to call him with the results  - If his PSA has come down, then the level will depend on future testing in either 3 or 6 months  - If his PSA did not come down or carmelina, we will then need to plan for a visit to discuss a prostate biopsy  -This is a chronic problem with possible progression and uncertain prognosis    Time spent: 18 minutes spent on the date of the encounter doing chart review, history and exam, documentation and further activities as noted above.    Note copy attestation: the elements have been reviewed, edited as needed, and remain pertinent to today's visit.     Lee Naranjo MD   Urology  [facility]  [facility] Phone: [phone]  [facility] Phone: [phone]

## 2024-09-06 ENCOUNTER — OFFICE VISIT (OUTPATIENT)
Dept: URGENT CARE | Facility: URGENT CARE | Age: 74
End: 2024-09-06
Payer: MEDICARE

## 2024-09-06 VITALS
WEIGHT: 197.7 LBS | RESPIRATION RATE: 16 BRPM | SYSTOLIC BLOOD PRESSURE: 126 MMHG | HEART RATE: 90 BPM | BODY MASS INDEX: 26.08 KG/M2 | OXYGEN SATURATION: 100 % | TEMPERATURE: 97.8 F | DIASTOLIC BLOOD PRESSURE: 83 MMHG

## 2024-09-06 DIAGNOSIS — T63.441A BEE STING REACTION, ACCIDENTAL OR UNINTENTIONAL, INITIAL ENCOUNTER: Primary | ICD-10-CM

## 2024-09-06 PROCEDURE — 99213 OFFICE O/P EST LOW 20 MIN: CPT | Performed by: PHYSICIAN ASSISTANT

## 2024-09-06 RX ORDER — DEXAMETHASONE SODIUM PHOSPHATE 10 MG/ML
10 INJECTION INTRAMUSCULAR; INTRAVENOUS ONCE
Status: COMPLETED | OUTPATIENT
Start: 2024-09-06 | End: 2024-09-06

## 2024-09-06 RX ADMIN — DEXAMETHASONE SODIUM PHOSPHATE 10 MG: 10 INJECTION INTRAMUSCULAR; INTRAVENOUS at 10:17

## 2024-09-06 ASSESSMENT — ENCOUNTER SYMPTOMS
VOMITING: 0
MYALGIAS: 0
NAUSEA: 0
CONSTITUTIONAL NEGATIVE: 1
DIARRHEA: 0
ABDOMINAL PAIN: 0
SORE THROAT: 0
PALPITATIONS: 0
CARDIOVASCULAR NEGATIVE: 1
DYSURIA: 0
NEUROLOGICAL NEGATIVE: 1
CHEST TIGHTNESS: 0
HEMATURIA: 0
CHILLS: 0
ARTHRALGIAS: 0
GASTROINTESTINAL NEGATIVE: 1
FREQUENCY: 0
FEVER: 0
ALLERGIC/IMMUNOLOGIC NEGATIVE: 1
SHORTNESS OF BREATH: 0
WHEEZING: 0
HEADACHES: 0
RESPIRATORY NEGATIVE: 1
COUGH: 0

## 2024-09-06 NOTE — NURSING NOTE
Clinic Administered Medication Documentation    Patient was given Decadron. Prior to medication administration, verified patient's identity using patient's name and date of birth.    Augie Andrade CMA

## 2024-09-06 NOTE — PROGRESS NOTES
Chief Complaint:     Chief Complaint   Patient presents with    Urgent Care    Insect Bite     Was cleaning the hornet nest yesterday and got stung on left thumb at 4 o'clock in the evening, woke up with the swelling today       ASSESSMENT     1. Bee sting reaction, accidental or unintentional, initial encounter         PLAN    Patient is in no acute distress.  No indication of anaphylaxis or angioedema.  Patient given 10 Mg Decadron PO in clinic today.   Start taking Benadryl or allergy medication.  RICE discussed  Recommended rest and avoidance of activities which cause pain or swelling.  Pain relief: Acetaminophen and or Ibuprofen with food.  Patient verbalized understanding, and agrees with this plan.    HPI: Javier Lovett is an 74 year old male who presents today for evaluation of bee sting in the L thumb.  This happened yesterday.  This morning he woke up and noticed redness and swelling in the thumb.  He has not tried anything for the symptoms.      Patient denies any pain, numbness, tingling, or dysfunction of the thumb.    ROS:      Review of Systems   Constitutional: Negative.  Negative for chills and fever.   HENT: Negative.  Negative for sore throat.    Respiratory: Negative.  Negative for cough, chest tightness, shortness of breath and wheezing.    Cardiovascular: Negative.  Negative for chest pain and palpitations.   Gastrointestinal: Negative.  Negative for abdominal pain, diarrhea, nausea and vomiting.   Genitourinary:  Negative for dysuria, frequency, hematuria and urgency.   Musculoskeletal:  Negative for arthralgias and myalgias.        Joint Swelling   Skin:  Negative for rash.   Allergic/Immunologic: Negative.  Negative for immunocompromised state.   Neurological: Negative.  Negative for headaches.        Problem history  Patient Active Problem List   Diagnosis    CARDIOVASCULAR SCREENING; LDL GOAL LESS THAN 100    Hypertrophic cardiomyopathy (H)    MR (mitral regurgitation)    Mood disorder  (H24)    Atrial fibrillation (H)    Heart murmur        Allergies  Allergies   Allergen Reactions    Penicillins         Smoking History  History   Smoking Status    Never   Smokeless Tobacco    Never        Current Meds    Current Outpatient Medications:     apixaban ANTICOAGULANT (ELIQUIS) 5 MG tablet, Take 5 mg by mouth 2 times daily, Disp: , Rfl:     finasteride (PROSCAR) 5 MG tablet, Take 1 tablet (5 mg) by mouth daily, Disp: 90 tablet, Rfl: 3    metoprolol succinate ER (TOPROL XL) 25 MG 24 hr tablet, 37 mg daily, Disp: , Rfl:     risperiDONE (RISPERDAL) 0.5 MG tablet, 1 mg every other day, 0.5 mg every other, Disp: , Rfl:     bisacodyl (DULCOLAX) 5 MG EC tablet, Take as directed. One day before exam take 2 tablets at 3 PM. Take 2 tablets at 11 PM. (Patient not taking: Reported on 4/6/2023), Disp: 4 tablet, Rfl: 0    cetirizine (ZYRTEC) 10 MG tablet, Take 1 tablet (10 mg) by mouth daily (Patient not taking: Reported on 9/6/2024), Disp: 30 tablet, Rfl: 0    cholecalciferol 25 MCG (1000 UT) TABS, Take 25 mcg by mouth (Patient not taking: Reported on 9/6/2024), Disp: , Rfl:     CLINDAMYCIN HCL PO, Prior to dental work (Patient not taking: Reported on 4/6/2023), Disp: , Rfl:     COVID-19 mRNA vacc, PFIZER, 30 MCG/0.3ML injection, , Disp: , Rfl:     Multiple Vitamins-Minerals (MULTI VITAMIN/MINERALS PO), Take  by mouth daily. (Patient not taking: Reported on 9/6/2024), Disp: , Rfl:     order for DME, Equipment being ordered: BP monitor machine (Patient not taking: Reported on 4/6/2023), Disp: 1 Device, Rfl: 0    polyethylene glycol (GOLYTELY) 236 g suspension, Take as directed. One day before exam fill the jug with water. Cover and shake until well mixed. At 6 PM start drinking an 8oz glass of mixture every 15 minutes until jug is 1/2 empty. Store remainder in the refrigerator.  At 11 PM Start drinking the other half of the Golytely jug. Drink one 8-ounce glass every 15 minutes until the jug is empty. (Patient not  taking: Reported on 9/6/2024), Disp: 4000 mL, Rfl: 0    vitamin C (ASCORBIC ACID) 500 MG tablet, Take 1,500 mg by mouth (Patient not taking: Reported on 9/6/2024), Disp: , Rfl:     Current Facility-Administered Medications:     dexAMETHasone (DECADRON) injectable solution used ORALLY 10 mg, 10 mg, Oral, Once,         Vital signs reviewed by Ashu Villalba PA-C  /83 (BP Location: Left arm, Patient Position: Sitting, Cuff Size: Adult Regular)   Pulse 90   Temp 97.8  F (36.6  C) (Oral)   Resp 16   Wt 89.7 kg (197 lb 11.2 oz)   SpO2 100%   BMI 26.08 kg/m      Physical Exam     Physical Exam  Vitals and nursing note reviewed.   Constitutional:       General: He is not in acute distress.     Appearance: He is well-developed. He is not ill-appearing, toxic-appearing or diaphoretic.   HENT:      Head: Normocephalic and atraumatic.      Right Ear: Hearing, tympanic membrane, ear canal and external ear normal. Tympanic membrane is not perforated, erythematous, retracted or bulging.      Left Ear: Hearing, tympanic membrane, ear canal and external ear normal. Tympanic membrane is not perforated, erythematous, retracted or bulging.      Nose: Nose normal. No mucosal edema, congestion or rhinorrhea.      Mouth/Throat:      Pharynx: No oropharyngeal exudate or posterior oropharyngeal erythema.      Tonsils: No tonsillar exudate or tonsillar abscesses. 0 on the right. 0 on the left.   Eyes:      Pupils: Pupils are equal, round, and reactive to light.   Cardiovascular:      Rate and Rhythm: Normal rate and regular rhythm.      Heart sounds: Normal heart sounds, S1 normal and S2 normal. Heart sounds not distant. No murmur heard.     No friction rub. No gallop.   Pulmonary:      Effort: Pulmonary effort is normal. No respiratory distress.      Breath sounds: Normal breath sounds. No decreased breath sounds, wheezing, rhonchi or rales.   Abdominal:      General: Bowel sounds are normal. There is no distension.       Palpations: Abdomen is soft.      Tenderness: There is no abdominal tenderness.   Musculoskeletal:      Left hand: Swelling present. No tenderness or bony tenderness. Normal range of motion. Normal strength. Normal sensation. There is no disruption of two-point discrimination. Normal capillary refill. Normal pulse.      Cervical back: Normal range of motion and neck supple.      Comments: Mild erythema and swelling of the L thumb.   Lymphadenopathy:      Cervical: No cervical adenopathy.   Skin:     General: Skin is warm and dry.      Findings: No rash.   Neurological:      Mental Status: He is alert.      Cranial Nerves: No cranial nerve deficit.   Psychiatric:         Attention and Perception: He is attentive.         Speech: Speech normal.         Behavior: Behavior normal. Behavior is cooperative.         Thought Content: Thought content normal.         Judgment: Judgment normal.             Ashu Villalba PA-C  9/6/2024, 10:02 AM

## 2024-09-08 ENCOUNTER — HEALTH MAINTENANCE LETTER (OUTPATIENT)
Age: 74
End: 2024-09-08

## 2024-09-10 ENCOUNTER — TELEPHONE (OUTPATIENT)
Dept: UROLOGY | Facility: CLINIC | Age: 74
End: 2024-09-10
Payer: MEDICARE

## 2024-09-10 NOTE — TELEPHONE ENCOUNTER
[facility] Call Center    Phone Message    May a detailed message be left on voicemail: yes     Reason for Call: Other: Pt wants to know what meds to take  or no take before his lab appointment on the 20 th of September. Please call pt back for further discussion. Thanks.     Action Taken: Other: [facility]    Travel Screening: Not Applicable     Date of Service:

## 2024-09-10 NOTE — TELEPHONE ENCOUNTER
9/10 Spoke with patient and informed him that he doesn't have to stop taking any medications prior to PSA lab appointment on 9/20/24. Patient verbalized understanding and had no further questions at this time.     Closing encounter.  Katrina Hernandez MA on 9/10/2024 at 4:10 PM

## 2024-09-20 ENCOUNTER — LAB (OUTPATIENT)
Dept: LAB | Facility: CLINIC | Age: 74
End: 2024-09-20
Payer: MEDICARE

## 2024-09-20 DIAGNOSIS — R97.20 ELEVATED PROSTATE SPECIFIC ANTIGEN (PSA): ICD-10-CM

## 2024-09-20 LAB — PSA SERPL DL<=0.01 NG/ML-MCNC: 3.85 NG/ML (ref 0–6.5)

## 2024-09-20 PROCEDURE — 84153 ASSAY OF PSA TOTAL: CPT

## 2024-09-20 PROCEDURE — 36415 COLL VENOUS BLD VENIPUNCTURE: CPT

## 2024-09-27 ENCOUNTER — TELEPHONE (OUTPATIENT)
Dept: UROLOGY | Facility: CLINIC | Age: 74
End: 2024-09-27
Payer: MEDICARE

## 2024-09-27 DIAGNOSIS — R97.20 ELEVATED PROSTATE SPECIFIC ANTIGEN (PSA): Primary | ICD-10-CM

## 2024-09-27 NOTE — TELEPHONE ENCOUNTER
Patient is scheduled for an upcoming appointment with   on 4/3/24.     Per result note 9/20/24: Plan  -Plan for PSA recheck and follow-up in 6 months    Orders placed: PSA pended for RN or provider to sign.     Patient is scheduled 4/2/24 to complete PSA lab.       Routed to inbasket for lab order to be signed. Once signed message should be delayed until 4/2/24.    Routing to the inbasket to ensure patient completes lab/imaging      Appointments in Next Year      Apr 02, 2025 1:00 PM  LAB with LAB FIRST FLOOR [facility] ([facility]) [phone]     Apr 03, 2025 2:15 PM  (Arrive by 2:00 PM)  Return Patient with Lee Naranjo MD  [facility] ([facility]) [phone]          Analisa Jalloh LPN on 9/27/2024 at 9:06 AM

## 2024-09-27 NOTE — TELEPHONE ENCOUNTER
Future PSA lab order placed and ordered per Dr. Naranjo in preparation for patient's 4/3/25 visit with Dr. Naranjo.     Yesenia Camilo RN, BSN

## 2025-01-09 ENCOUNTER — TELEPHONE (OUTPATIENT)
Dept: FAMILY MEDICINE | Facility: CLINIC | Age: 75
End: 2025-01-09
Payer: MEDICARE

## 2025-01-09 NOTE — TELEPHONE ENCOUNTER
Called and LVM informing patient he will have to schedule with the first available provider as [facility] has denied appointment request.    Esha Guevara

## 2025-01-09 NOTE — TELEPHONE ENCOUNTER
Reason for Call:  Appointment Request    Patient requesting this type of appt:  Hospital/ED Follow-Up     Requested provider: Rafaela Marcano    Reason patient unable to be scheduled: Not within requested timeframe    When does patient want to be seen/preferred time: 3-7 days    Comments: ER F/U Heart Activity needs to be seen 1 -5 days    Could we send this information to you in [facility] or would you prefer to receive a phone call?:   Patient would prefer a phone call   Okay to leave a detailed message?: Yes at Home number on file [phone] (home)    Call taken on 1/9/2025 at 10:27 AM by Rebecca Michelle

## 2025-01-09 NOTE — TELEPHONE ENCOUNTER
Patient can establish with new PCP at our clinic since I haven't seen him for 5 years.    Rafaela Herr MD

## 2025-01-09 NOTE — TELEPHONE ENCOUNTER
Patient called back and was scheduled for follow up on 01/13/2025 with Krystin Morrison.    Esha Guevara

## 2025-01-13 ENCOUNTER — OFFICE VISIT (OUTPATIENT)
Dept: FAMILY MEDICINE | Facility: CLINIC | Age: 75
End: 2025-01-13
Payer: MEDICARE

## 2025-01-13 VITALS
RESPIRATION RATE: 20 BRPM | TEMPERATURE: 97.5 F | SYSTOLIC BLOOD PRESSURE: 131 MMHG | OXYGEN SATURATION: 97 % | DIASTOLIC BLOOD PRESSURE: 77 MMHG | WEIGHT: 205 LBS | HEART RATE: 90 BPM | HEIGHT: 73 IN | BODY MASS INDEX: 27.17 KG/M2

## 2025-01-13 DIAGNOSIS — I42.1 HOCM (HYPERTROPHIC OBSTRUCTIVE CARDIOMYOPATHY) (H): ICD-10-CM

## 2025-01-13 DIAGNOSIS — Z29.11 NEED FOR VACCINATION AGAINST RESPIRATORY SYNCYTIAL VIRUS: ICD-10-CM

## 2025-01-13 DIAGNOSIS — E55.9 VITAMIN D DEFICIENCY, UNSPECIFIED: ICD-10-CM

## 2025-01-13 DIAGNOSIS — I47.29 NSVT (NONSUSTAINED VENTRICULAR TACHYCARDIA) (H): ICD-10-CM

## 2025-01-13 DIAGNOSIS — I48.0 PAROXYSMAL ATRIAL FIBRILLATION (H): Primary | ICD-10-CM

## 2025-01-13 DIAGNOSIS — I34.0 NONRHEUMATIC MITRAL VALVE REGURGITATION: ICD-10-CM

## 2025-01-13 DIAGNOSIS — D64.9 ANEMIA, UNSPECIFIED TYPE: ICD-10-CM

## 2025-01-13 DIAGNOSIS — E78.00 HYPERCHOLESTEROLEMIA: ICD-10-CM

## 2025-01-13 PROBLEM — D68.69 OTHER THROMBOPHILIA: Status: ACTIVE | Noted: 2024-06-14

## 2025-01-13 PROBLEM — R55 SYNCOPE AND COLLAPSE: Status: ACTIVE | Noted: 2022-12-11

## 2025-01-13 LAB — FOLATE SERPL-MCNC: >40 NG/ML (ref 4.6–34.8)

## 2025-01-13 PROCEDURE — 83550 IRON BINDING TEST: CPT | Performed by: NURSE PRACTITIONER

## 2025-01-13 PROCEDURE — 82607 VITAMIN B-12: CPT | Performed by: NURSE PRACTITIONER

## 2025-01-13 PROCEDURE — 80076 HEPATIC FUNCTION PANEL: CPT | Performed by: NURSE PRACTITIONER

## 2025-01-13 PROCEDURE — 99214 OFFICE O/P EST MOD 30 MIN: CPT | Performed by: NURSE PRACTITIONER

## 2025-01-13 PROCEDURE — 83540 ASSAY OF IRON: CPT | Performed by: NURSE PRACTITIONER

## 2025-01-13 PROCEDURE — 82746 ASSAY OF FOLIC ACID SERUM: CPT | Performed by: NURSE PRACTITIONER

## 2025-01-13 PROCEDURE — 82728 ASSAY OF FERRITIN: CPT | Performed by: NURSE PRACTITIONER

## 2025-01-13 PROCEDURE — 82306 VITAMIN D 25 HYDROXY: CPT | Performed by: NURSE PRACTITIONER

## 2025-01-13 PROCEDURE — 36415 COLL VENOUS BLD VENIPUNCTURE: CPT | Performed by: NURSE PRACTITIONER

## 2025-01-13 PROCEDURE — 80061 LIPID PANEL: CPT | Performed by: NURSE PRACTITIONER

## 2025-01-13 RX ORDER — RISPERIDONE 0.25 MG/1
0.25 TABLET ORAL
COMMUNITY
Start: 2024-11-16

## 2025-01-13 ASSESSMENT — PAIN SCALES - GENERAL: PAINLEVEL_OUTOF10: NO PAIN (0)

## 2025-01-13 NOTE — PATIENT INSTRUCTIONS
At [facility], we strive to deliver an exceptional experience to you, every time we see you. If you receive a survey, please let us know what we are doing well and/or what we could improve upon, as we do value your feedback.  If you have MyChart, you can expect to receive results automatically within 24 hours of their completion.  Your provider will send a note interpreting your results as well.   If you do not have MyChart, you should receive your results in about a week by mail.    Your care team:                            Family Medicine Internal Medicine   MD Navjot Pompa, MD Rafaela Fernandez, MD Rudy Araya, MD Courtney Toussaint, PAMaureenC    Inocente Richard, MD Pediatrics   Genie Blood, MD Rylee Elaine, MD Krystin Morrison, APRN CNP Ilana Minor APRN CNP   MD Tracy Washington, MD Ruth Ann Mata, CNP     Jose Alfredo Fernando, CNP Same-Day Provider (No follow-up visits)   VENESSA Whittington, DNP Susy Perez, VENESSA Hinds, FNP, BC ANASTASIYA MunozC     Clinic hours: Monday - Thursday 7 am-6 pm; Fridays 7 am-5 pm.   Urgent care: Monday - Friday 10 am- 8 pm; Saturday and Sunday 9 am-5 pm.    Clinic: [phone]       [facility]: Monday - Thursday 8 am - 7 pm; Friday 8 am - 6 pm  [facility]: [phone]

## 2025-01-13 NOTE — PROGRESS NOTES
"  {PROVIDER CHARTING PREFERENCE:558113}    Subjective   Javier is a 74 year old, presenting for the following health issues:  Post ER Check (Marjan Nina)        1/13/2025     4:15 PM   Additional Questions   Roomed by Joe   Accompanied by Self     HPI       ED/UC Followup:    Facility:  [facility]  Date of visit: 1/8/25  Reason for visit: Afib  Current Status: Feeling better  He continues on Eliquis, Toprol 37.5 mg BID and tolerating well. He denies further palpitations, no dizziness, weakness, ambulates with 4 prong cane.      Still having urinary frequency, getting up 3 times/noc to void, on finasteride 2.5 mg daily.     {additonal problems for provider to add (Optional):946056}    {ROS Picklists (Optional):148910}      Objective    /77 (BP Location: Left arm, Patient Position: Chair, Cuff Size: Adult Regular)   Pulse 90   Temp 97.5  F (36.4  C) (Temporal)   Resp 20   Ht 1.854 m (6' 1\")   Wt 93 kg (205 lb)   SpO2 97%   BMI 27.05 kg/m    Body mass index is 27.05 kg/m .  Physical Exam   {Exam List (Optional):362813}    {Diagnostic Test Results (Optional):517008}        Signed Electronically by: VENESSA Sweeney CNP  {Email feedback regarding this note to [email or web address]   :855813}  " "getting up 3 times/noc to void, on finasteride 2.5 mg daily.     Hyperlipidemia Follow-Up    Are you regularly taking any medication or supplement to lower your cholesterol?   No  Are you having muscle aches or other side effects that you think could be caused by your cholesterol lowering medication?      Anemia- Hgb 13.5 1/8/25. Wondering if he needs an iron supplement.    How many servings of fruits and vegetables do you eat daily?  2-3  On average, how many sweetened beverages do you drink each day (Examples: soda, juice, sweet tea, etc.  Do NOT count diet or artificially sweetened beverages)?   1  How many days per week do you exercise enough to make your heart beat faster? 3 or less  How many minutes a day do you exercise enough to make your heart beat faster? 9 or less  How many days per week do you miss taking your medication? 0      Review of Systems  Constitutional, HEENT, cardiovascular, pulmonary, gi and gu systems are negative, except as otherwise noted.      Objective    /77 (BP Location: Left arm, Patient Position: Chair, Cuff Size: Adult Regular)   Pulse 90   Temp 97.5  F (36.4  C) (Temporal)   Resp 20   Ht 1.854 m (6' 1\")   Wt 93 kg (205 lb)   SpO2 97%   BMI 27.05 kg/m    Body mass index is 27.05 kg/m .  Physical Exam   GENERAL: alert and no distress  EYES: Eyes grossly normal to inspection, PERRL and conjunctivae and sclerae normal  HENT: ear canals and TM's normal, nose and mouth without ulcers or lesions  NECK: no adenopathy, no asymmetry, masses, or scars  RESP: lungs clear to auscultation - no rales, rhonchi or wheezes  CV: regular rate and rhythm, normal S1 S2, no S3 or S4, no murmur, click or rub, no peripheral edema  ABDOMEN: soft, nontender, no hepatosplenomegaly, no masses and bowel sounds normal  MS: no gross musculoskeletal defects noted, no edema  SKIN: no suspicious lesions or rashes  NEURO: Normal strength and tone, mentation intact and speech normal  PSYCH: mentation " appears normal, affect normal/bright    Results for orders placed or performed in visit on 01/13/25   Ferritin     Status: Normal   Result Value Ref Range    Ferritin 69 31 - 409 ng/mL   Iron and iron binding capacity     Status: Abnormal   Result Value Ref Range    Iron 45 (L) 61 - 157 ug/dL    Iron Binding Capacity 292 240 - 430 ug/dL    Iron Sat Index 15 15 - 46 %   Vitamin B12     Status: Normal   Result Value Ref Range    Vitamin B12 686 232 - 1,245 pg/mL   Folate     Status: Abnormal   Result Value Ref Range    Folic Acid >40.0 (H) 4.6 - 34.8 ng/mL   Lipid panel reflex to direct LDL Non-fasting     Status: None   Result Value Ref Range    Cholesterol 159 <200 mg/dL    Triglycerides 102 <150 mg/dL    Direct Measure HDL 51 >=40 mg/dL    LDL Cholesterol Calculated 88 <100 mg/dL    Non HDL Cholesterol 108 <130 mg/dL    Patient Fasting > 8hrs? No     Narrative    Cholesterol  Desirable: < 200 mg/dL  Borderline High: 200 - 239 mg/dL  High: >= 240 mg/dL    Triglycerides  Normal: < 150 mg/dL  Borderline High: 150 - 199 mg/dL  High: 200-499 mg/dL  Very High: >= 500 mg/dL    Direct Measure HDL  Female: >= 50 mg/dL   Male: >= 40 mg/dL    LDL Cholesterol  Desirable: < 100 mg/dL  Above Desirable: 100 - 129 mg/dL   Borderline High: 130 - 159 mg/dL   High:  160 - 189 mg/dL   Very High: >= 190 mg/dL    Non HDL Cholesterol  Desirable: < 130 mg/dL  Above Desirable: 130 - 159 mg/dL  Borderline High: 160 - 189 mg/dL  High: 190 - 219 mg/dL  Very High: >= 220 mg/dL   Vitamin D Deficiency     Status: Abnormal   Result Value Ref Range    Vitamin D, Total (25-Hydroxy) 63 (H) 20 - 50 ng/mL    Narrative    Season, race, dietary intake, and treatment affect the concentration of 25-hydroxy-Vitamin D. Values may decrease during winter months and increase during summer months.    Vitamin D determination is routinely performed by an immunoassay specific for 25 hydroxyvitamin D3.  If an individual is on vitamin D2(ergocalciferol)  supplementation, please specify 25 OH vitamin D2 and D3 level determination by LCMSMS test VITD23.     Hepatic panel (Albumin, ALT, AST, Bili, Alk Phos, TP)     Status: Normal   Result Value Ref Range    Protein Total 6.6 6.4 - 8.3 g/dL    Albumin 4.1 3.5 - 5.2 g/dL    Bilirubin Total 0.3 <=1.2 mg/dL    Alkaline Phosphatase 97 40 - 150 U/L    AST 26 0 - 45 U/L    ALT 29 0 - 70 U/L    Bilirubin Direct <0.20 0.00 - 0.30 mg/dL           Signed Electronically by: VENESSA Sweeney CNP

## 2025-01-14 LAB
ALBUMIN SERPL BCG-MCNC: 4.1 G/DL (ref 3.5–5.2)
ALP SERPL-CCNC: 97 U/L (ref 40–150)
ALT SERPL W P-5'-P-CCNC: 29 U/L (ref 0–70)
AST SERPL W P-5'-P-CCNC: 26 U/L (ref 0–45)
BILIRUB DIRECT SERPL-MCNC: <0.2 MG/DL (ref 0–0.3)
BILIRUB SERPL-MCNC: 0.3 MG/DL
CHOLEST SERPL-MCNC: 159 MG/DL
FASTING STATUS PATIENT QL REPORTED: NO
FERRITIN SERPL-MCNC: 69 NG/ML (ref 31–409)
HDLC SERPL-MCNC: 51 MG/DL
IRON BINDING CAPACITY (ROCHE): 292 UG/DL (ref 240–430)
IRON SATN MFR SERPL: 15 % (ref 15–46)
IRON SERPL-MCNC: 45 UG/DL (ref 61–157)
LDLC SERPL CALC-MCNC: 88 MG/DL
NONHDLC SERPL-MCNC: 108 MG/DL
PROT SERPL-MCNC: 6.6 G/DL (ref 6.4–8.3)
TRIGL SERPL-MCNC: 102 MG/DL
VIT B12 SERPL-MCNC: 686 PG/ML (ref 232–1245)
VIT D+METAB SERPL-MCNC: 63 NG/ML (ref 20–50)

## 2025-03-12 ENCOUNTER — TELEPHONE (OUTPATIENT)
Dept: UROLOGY | Facility: CLINIC | Age: 75
End: 2025-03-12
Payer: MEDICARE

## 2025-03-12 NOTE — TELEPHONE ENCOUNTER
Patient is scheduled for an upcoming appointment with   on 4/2/25.     Per last note patient was to: Follow up in 6 months with PSA prior    Orders placed: PSA    Patient is scheduled 4/2/25 to complete PSA lab/imaging.     Routing to the inbasket to ensure patient completes lab/imaging    Katrina Hernandez MA on 3/12/2025 at 11:53 AM    Future Appointments 3/12/2025 - 9/8/2025        Date Visit Type Length Department Provider     4/2/2025  1:00 PM LAB 15 min [facility] LAB FIRST FLOOR [facility]    Location Instructions:     The clinic is located at [address] in [place].&nbsp; We offer free parking in our on-site lot.              4/3/2025  2:15 PM RETURN PATIENT 15 min [facility] Lee Naranjo MD

## 2025-04-02 ENCOUNTER — LAB (OUTPATIENT)
Dept: LAB | Facility: CLINIC | Age: 75
End: 2025-04-02
Payer: MEDICARE

## 2025-04-02 DIAGNOSIS — R97.20 ELEVATED PROSTATE SPECIFIC ANTIGEN (PSA): ICD-10-CM

## 2025-04-02 DIAGNOSIS — Z13.1 SCREENING FOR DIABETES MELLITUS: Primary | ICD-10-CM

## 2025-04-02 LAB
ALBUMIN SERPL BCG-MCNC: 4.1 G/DL (ref 3.5–5.2)
ALP SERPL-CCNC: 112 U/L (ref 40–150)
ALT SERPL W P-5'-P-CCNC: 14 U/L (ref 0–70)
ANION GAP SERPL CALCULATED.3IONS-SCNC: 9 MMOL/L (ref 7–15)
AST SERPL W P-5'-P-CCNC: 19 U/L (ref 0–45)
BILIRUB SERPL-MCNC: 0.4 MG/DL
BUN SERPL-MCNC: 18.3 MG/DL (ref 8–23)
CALCIUM SERPL-MCNC: 9.6 MG/DL (ref 8.8–10.4)
CHLORIDE SERPL-SCNC: 102 MMOL/L (ref 98–107)
CREAT SERPL-MCNC: 1.07 MG/DL (ref 0.67–1.17)
EGFRCR SERPLBLD CKD-EPI 2021: 74 ML/MIN/1.73M2
GLUCOSE SERPL-MCNC: 97 MG/DL (ref 70–99)
HCO3 SERPL-SCNC: 29 MMOL/L (ref 22–29)
POTASSIUM SERPL-SCNC: 4 MMOL/L (ref 3.4–5.3)
PROT SERPL-MCNC: 7 G/DL (ref 6.4–8.3)
PSA SERPL DL<=0.01 NG/ML-MCNC: 2.69 NG/ML (ref 0–6.5)
SODIUM SERPL-SCNC: 140 MMOL/L (ref 135–145)

## 2025-04-02 PROCEDURE — 36415 COLL VENOUS BLD VENIPUNCTURE: CPT

## 2025-04-02 PROCEDURE — 80053 COMPREHEN METABOLIC PANEL: CPT

## 2025-04-02 PROCEDURE — 84153 ASSAY OF PSA TOTAL: CPT

## 2025-04-02 NOTE — TELEPHONE ENCOUNTER
Spoke with patient, he rescheduled lab to 3 pm on 4/2/25.     Shannan Boone on 4/2/2025 at 1:50 PM

## 2025-04-03 ENCOUNTER — OFFICE VISIT (OUTPATIENT)
Dept: UROLOGY | Facility: CLINIC | Age: 75
End: 2025-04-03
Payer: MEDICARE

## 2025-04-03 DIAGNOSIS — R97.20 ELEVATED PROSTATE SPECIFIC ANTIGEN (PSA): ICD-10-CM

## 2025-04-03 DIAGNOSIS — R39.9 LOWER URINARY TRACT SYMPTOMS (LUTS): Primary | ICD-10-CM

## 2025-04-03 RX ORDER — FINASTERIDE 5 MG/1
5 TABLET, FILM COATED ORAL DAILY
Qty: 90 TABLET | Refills: 3 | Status: SHIPPED | OUTPATIENT
Start: 2025-04-03

## 2025-04-03 RX ORDER — DILTIAZEM HYDROCHLORIDE 120 MG/1
120 CAPSULE, COATED, EXTENDED RELEASE ORAL
COMMUNITY
Start: 2025-03-24

## 2025-04-03 NOTE — NURSING NOTE
Javier Lovett's goals for this visit include:   Chief Complaint   Patient presents with    RECHECK     LUTS and PSA prior 4/2 2.69       He requests these members of his care team be copied on today's visit information:     PCP: Rafaela Marcano    Referring Provider:  VENESSA Hayward [facility]  [address] [phone]     post void residual: 24 mL     Do you need any medication refills at today's visit?     Shannan Boone on 4/3/2025 at 2:31 PM

## 2025-04-03 NOTE — PROGRESS NOTES
[facility]   CHIEF COMPLAINT   It was my pleasure to see Javier Lovett who is a 74 year old male for follow-up of Elevated PSA and LUTS.      HPI   Javier Lovett is a very pleasant 74 year old male     Initially seen 10/13/2022:  Javier Lovett is a 72 year old male who is being seen for evaluation of Elevated PSA and LUTS     He does have some LUTS  Slower stream  Increased urgency  Nocturia 2x/night  He does drink 9 glasses of water during the day  He stops drinking at 6pm and goes to bed at 10pm     He is on Eliquis due to hypertrophic cardiomyopathy      No significant family history of prostate cancer     4/6/2023:  He stopped tamsulosin 0.4mg (Flomax) after concern for hypotension in December - he was also diagnosed with COVID at that time as well  He did note an improved flow and stronger stream with tamsulosin 0.4mg (Flomax)   He has now been off of tamsulosin  He does have baseline slight dizziness and orthostatic hypotension    AUASS: 9-1-1-2-3-2-2/3 = 19/20  QOL = 3  PVR = 80    6/20/2024:  Trial of alfuzoin at last visit with issues with blood pressure  Started on finasteride 5mg (Proscar) in April 2023  Taking every every other day  Ran out in April and has been off of this since  He notes if he well hydrated his flow is better    AUASS: 5-4-4-4-4-3-2 = 26  QOL = 4  PVR = 11cc    TODAY 4/3/2025:  Follow-up today for annual visit  he has been doing well with finasteride 5 mg daily  He is happy with his urination.    AUASS: 6-6-9-3-2-2-2/3 = 15/16  QOL = 2  PVR = 24cc      PHYSICAL EXAM  Patient is a 74 year old  male   Vitals: There were no vitals taken for this visit.  There is no height or weight on file to calculate BMI.  General Appearance Adult:   Alert, no acute distress, oriented  HENT: throat/mouth:normal, good dentition  Lungs: no respiratory distress, or pursed lip breathing  Heart: No obvious jugular venous distension present  Musculoskeltal: extremities normal, no peripheral edema  Neuro: Alert,  oriented, speech and mentation normal  Psych: affect and mood normal     PSA PSA Tumor Marker   Latest Ref Rng 0.00 - 4.00 ug/L 0.00 - 6.50 ng/mL   8/1/2022 5.92 (H)     10/13/2022 5.81 (H)     4/6/2023 4.95 (H)     6/20/2024  7.15 (H)    Finasteride 5mg (Proscar)     PSA Tumor Marker   Latest Ref Rng 0.00 - 6.50 ng/mL   9/20/2024 3.85    4/2/2025 2.69           Creatinine   Date Value Ref Range Status   04/02/2025 1.07 0.67 - 1.17 mg/dL Final   06/21/2017 1.14 0.66 - 1.25 mg/dL Final      IMAGING:  All pertinent imaging reviewed:    All imaging studies reviewed by me.  I personally reviewed these imaging films.  A formal report from radiology will follow.    MRI PROSTATE 3/20/2023:  FINDINGS:  PERIPHERAL ZONE: No foci of restricted diffusion are identified to suggest clinically significant malignancy.     TRANSITIONAL ZONE: Moderate BPH changes secondary to benign prostatic hypertrophy. No suspicious lesions.     No seminal vesicle invasion.  No pelvic lymphadenopathy.  No lesions in the visualized bones.  Sigmoid diverticulosis without acute diverticulitis.     PROSTATE GLAND VOLUME: 104 cc                                                     IMPRESSION:  1.  Prostate MRI is assigned PI-RADS CATEGORY 2: Low. Clinically significant cancer is unlikely to be present.                ASSESSMENT and PLAN  74-year-old man with evidence of BPH and LUTS with an elevated PSA    BPH with LUTS  -We discussed the pathophysiology of the bladder and the prostate and the changes associated with the development of BPH and LUTS  -Again reviewed that he is unable to take alpha blockers given the effects on his blood pressure  -He has been doing well on finasteride 5 mg daily  - Refill prescription sent to the pharmacy  - Follow-up in 1 year for symptom check    Elevated PSA  -We again discussed the use of PSA as a screening test for prostate cancer  - I reviewed his PSA history and trend  - We again discussed the expected change in  PSA with the use of finasteride and his PSA is very reassuring and has come down by greater than 50%  - Will plan for continued annual PSA monitoring    Time spent: 12 minutes spent on the date of the encounter doing chart review, history and exam, documentation and further activities as noted above.    Note copy attestation: the elements have been reviewed, edited as needed, and remain pertinent to today's visit.     Lee Naranjo MD   Urology  [facility]  [facility] Phone: [phone]  [facility] Phone: [phone]

## 2025-08-20 ENCOUNTER — PATIENT OUTREACH (OUTPATIENT)
Dept: CARE COORDINATION | Facility: CLINIC | Age: 75
End: 2025-08-20
Payer: MEDICARE

## (undated) RX ORDER — SIMETHICONE 40MG/0.6ML
SUSPENSION, DROPS(FINAL DOSAGE FORM)(ML) ORAL
Status: DISPENSED
Start: 2022-09-23

## (undated) RX ORDER — FENTANYL CITRATE 50 UG/ML
INJECTION, SOLUTION INTRAMUSCULAR; INTRAVENOUS
Status: DISPENSED
Start: 2022-09-23